# Patient Record
Sex: FEMALE | Race: BLACK OR AFRICAN AMERICAN | Employment: OTHER | URBAN - METROPOLITAN AREA
[De-identification: names, ages, dates, MRNs, and addresses within clinical notes are randomized per-mention and may not be internally consistent; named-entity substitution may affect disease eponyms.]

---

## 2017-01-12 RX ORDER — AMLODIPINE BESYLATE 10 MG/1
TABLET ORAL
Qty: 90 TAB | Refills: 3 | Status: SHIPPED | OUTPATIENT
Start: 2017-01-12 | End: 2017-02-13 | Stop reason: SDUPTHER

## 2017-01-12 RX ORDER — LISINOPRIL AND HYDROCHLOROTHIAZIDE 12.5; 2 MG/1; MG/1
TABLET ORAL
Qty: 90 TAB | Refills: 3 | Status: SHIPPED | OUTPATIENT
Start: 2017-01-12 | End: 2017-02-13 | Stop reason: SDUPTHER

## 2017-01-12 NOTE — TELEPHONE ENCOUNTER
In Basket:   Britt San (pt) requesting Rx refill for: lisinopril-hydrochlorothiazide (PRINZIDE, ZESTORETIC) 20-12.5 mg per tablet and amLODIPine (NORVASC) 10 mg tablet.  Pt contact # [phone]

## 2017-02-13 ENCOUNTER — OFFICE VISIT (OUTPATIENT)
Dept: INTERNAL MEDICINE CLINIC | Age: 67
End: 2017-02-13

## 2017-02-13 VITALS
DIASTOLIC BLOOD PRESSURE: 96 MMHG | SYSTOLIC BLOOD PRESSURE: 136 MMHG | BODY MASS INDEX: 29.02 KG/M2 | HEIGHT: 63 IN | WEIGHT: 163.8 LBS | RESPIRATION RATE: 16 BRPM | OXYGEN SATURATION: 100 % | TEMPERATURE: 98.3 F | HEART RATE: 80 BPM

## 2017-02-13 DIAGNOSIS — I10 ESSENTIAL HYPERTENSION: ICD-10-CM

## 2017-02-13 DIAGNOSIS — E78.5 DYSLIPIDEMIA: ICD-10-CM

## 2017-02-13 DIAGNOSIS — M54.50 ACUTE MIDLINE LOW BACK PAIN WITHOUT SCIATICA: ICD-10-CM

## 2017-02-13 DIAGNOSIS — E11.9 TYPE 2 DIABETES MELLITUS WITHOUT COMPLICATION, WITHOUT LONG-TERM CURRENT USE OF INSULIN (HCC): Primary | ICD-10-CM

## 2017-02-13 RX ORDER — GABAPENTIN 100 MG/1
CAPSULE ORAL
Qty: 270 CAP | Refills: 3 | Status: SHIPPED | OUTPATIENT
Start: 2017-02-13 | End: 2018-03-08 | Stop reason: SDUPTHER

## 2017-02-13 RX ORDER — LISINOPRIL AND HYDROCHLOROTHIAZIDE 12.5; 2 MG/1; MG/1
TABLET ORAL
Qty: 90 TAB | Refills: 3 | Status: ON HOLD | OUTPATIENT
Start: 2017-02-13 | End: 2018-03-29 | Stop reason: SDUPTHER

## 2017-02-13 RX ORDER — AMLODIPINE BESYLATE 10 MG/1
TABLET ORAL
Qty: 90 TAB | Refills: 3 | Status: ON HOLD | OUTPATIENT
Start: 2017-02-13 | End: 2018-03-29 | Stop reason: SDUPTHER

## 2017-02-13 NOTE — PROGRESS NOTES
[address] [facility]  [place] [address]  [address] [facility] [address]  Phone:  [phone]  Fax: [phone]       Chief Complaint   Patient presents with    Follow-up     yearly check up for medication refills    . SUBJECTIVE:    Herlinda Duarte is a 78 y.o. female comes in for return visit stating that in general she is doing well. She has problems with intermittent low back pain. This pretty much remains in the lumbosacral spine area. She remains on her antihypertensive medication as prescribed as is the case with her statin in view of her increased cardiovascular risk. She does not keep her appointments on a regular basis and as a result her last hemoglobin A1C was done in August.  Hopefully it has indeed improved. She continues to lose weight. Current Outpatient Prescriptions   Medication Sig Dispense Refill    gabapentin (NEURONTIN) 100 mg capsule TAKE 1 CAPSULE BY MOUTH THREE TIMES DAILY 270 Cap 3    lisinopril-hydroCHLOROthiazide (PRINZIDE, ZESTORETIC) 20-12.5 mg per tablet TAKE 1 TABLET BY MOUTH DAILY FOR 90 DAYS 90 Tab 3    amLODIPine (NORVASC) 10 mg tablet TAKE 1 TABLET BY MOUTH ONCE A DAY FOR 90 DAYS 90 Tab 3    metFORMIN ER (GLUCOPHAGE XR) 500 mg tablet TAKE 2 tablets twice a day 360 Tab 3    pravastatin (PRAVACHOL) 80 mg tablet TAKE 1 TABLET BY MOUTH EVERY NIGHT AT BEDTIME 90 Tab 3    acetaminophen-codeine (TYLENOL #3) 300-30 mg per tablet Take 1 Tab by mouth every six (6) hours as needed for Pain. Max Daily Amount: 4 Tabs. 40 Tab 0    potassium chloride (K-DUR, KLOR-CON) 20 mEq tablet TAKE 1 TABLET BY MOUTH DAILY 90 Tab 3    dorzolamide (TRUSOPT) 2 % ophthalmic solution       NEXIUM 40 mg capsule       TRAVATAN Z 0.004 % ophthalmic solution       sitagliptin (JANUVIA) 100 mg tablet Take 100 mg by mouth daily.        Past Medical History   Diagnosis Date    Asthma     Diabetes ([address]     Hypercholesterolemia     Hypertension Past Surgical History   Procedure Laterality Date    Hx colonoscopy      Hx orthopaedic       Arthroscopic surgery for both knees    Hx gyn       status post hysterectomy,BSO,and C-sections x3     Allergies   Allergen Reactions    Percocet [Oxycodone-Acetaminophen] Rash and Itching         REVIEW OF SYSTEMS:  General: negative for - chills or fever  ENT: negative for - headaches, nasal congestion or tinnitus  Respiratory: negative for - cough, hemoptysis, shortness of breath or wheezing  Cardiovascular : negative for - chest pain, edema, palpitations or shortness of breath  Gastrointestinal: negative for - abdominal pain, blood in stools, heartburn or nausea/vomiting  Genito-Urinary: no dysuria, trouble voiding, or hematuria  Musculoskeletal: negative for - gait disturbance, joint pain, joint stiffness or joint swelling  Neurological: no TIA or stroke symptoms  Hematologic: no bruises, no bleeding, no swollen glands  Integument: no lumps, mole changes, nail changes or rash  Endocrine: no malaise/lethargy or unexpected weight changes      Social History     Social History    Marital status: SINGLE     Spouse name: N/A    Number of children: N/A    Years of education: N/A     Occupational History    retired [employer]      Social History Main Topics    Smoking status: Never Smoker    Smokeless tobacco: Never Used    Alcohol use 0.5 oz/week     1 Cans of beer per week    Drug use: No    Sexual activity: Not Asked     Other Topics Concern    None     Social History Narrative     Family History   Problem Relation Age of Onset    Cancer Mother     Lung Disease Father     Cancer Brother        OBJECTIVE:    Visit Vitals    BP (!) 136/96 (BP 1 Location: Left arm, BP Patient Position: Sitting)    Pulse 80    Temp 98.3 °F (36.8 °C) (Oral)    Resp 16    Ht 5' 3\" (1.6 m)    Wt 163 lb 12.8 oz (74.3 kg)    SpO2 100%    BMI 29.02 kg/m2     CONSTITUTIONAL: well , well nourished, appears age appropriate  EYES: perrla, eom intact  ENMT:moist mucous membranes, pharynx clear  NECK: supple. Thyroid normal  RESPIRATORY: Chest: clear to ascultation and percussion   CARDIOVASCULAR: Heart: regular rate and rhythm  GASTROINTESTINAL: Abdomen: soft, bowel sounds active  HEMATOLOGIC: no pathological lymph nodes palpated  MUSCULOSKELETAL: Extremities: no edema, pulse 1+   INTEGUMENT: No unusual rashes or suspicious skin lesions noted. Nails appear normal.  NEUROLOGIC: non-focal exam   MENTAL STATUS: alert and oriented, appropriate affect      ASSESSMENT:  1. Type 2 diabetes mellitus without complication, without long-term current use of insulin ([facility]    2. Essential hypertension    3. Dyslipidemia    4. Acute midline low back pain without sciatica        PLAN:    1. Her diabetes is hopefully doing well but I will await the results of her hemoglobin A1C. I remind her to minimize carbohydrate intake. 2. Blood pressure is excellent today and no adjustments are made. 3. She will continue her statin as prescribed and efficacy and compliance will be assessed. 4. I suspect the patient probably has lumbar spinal stenosis. Symptomatic treatment for now. I encourage her to remain as physically active as possible. .  Orders Placed This Encounter    HEMOGLOBIN A1C WITH EAG    METABOLIC PANEL, BASIC    APOLIPOPROTEIN B    gabapentin (NEURONTIN) 100 mg capsule    lisinopril-hydroCHLOROthiazide (PRINZIDE, ZESTORETIC) 20-12.5 mg per tablet    amLODIPine (NORVASC) 10 mg tablet         Follow-up Disposition:  Return in about 4 months (around 6/13/2017).       Lida Gill MD

## 2017-02-13 NOTE — PROGRESS NOTES
1. Have you been to the ER, urgent care clinic since your last visit? Hospitalized since your last visit? No    2. Have you seen or consulted any other health care providers outside of the [address] [place] [address] since your last visit? Include any pap smears or colon screening.  No

## 2017-02-14 LAB
APO B SERPL-MCNC: 79 MG/DL (ref 54–133)
BUN SERPL-MCNC: 14 MG/DL (ref 8–27)
BUN/CREAT SERPL: 18 (ref 11–26)
CALCIUM SERPL-MCNC: 10.5 MG/DL (ref 8.7–10.3)
CHLORIDE SERPL-SCNC: 103 MMOL/L (ref 96–106)
CO2 SERPL-SCNC: 24 MMOL/L (ref 18–29)
CREAT SERPL-MCNC: 0.79 MG/DL (ref 0.57–1)
EST. AVERAGE GLUCOSE BLD GHB EST-MCNC: 151 MG/DL
GLUCOSE SERPL-MCNC: 70 MG/DL (ref 65–99)
HBA1C MFR BLD: 6.9 % (ref 4.8–5.6)
POTASSIUM SERPL-SCNC: 4 MMOL/L (ref 3.5–5.2)
SODIUM SERPL-SCNC: 145 MMOL/L (ref 134–144)

## 2017-05-15 ENCOUNTER — OFFICE VISIT (OUTPATIENT)
Dept: INTERNAL MEDICINE CLINIC | Age: 67
End: 2017-05-15

## 2017-05-15 VITALS
OXYGEN SATURATION: 93 % | TEMPERATURE: 95.9 F | BODY MASS INDEX: 28.46 KG/M2 | HEART RATE: 80 BPM | SYSTOLIC BLOOD PRESSURE: 125 MMHG | HEIGHT: 63 IN | WEIGHT: 160.6 LBS | DIASTOLIC BLOOD PRESSURE: 87 MMHG | RESPIRATION RATE: 14 BRPM

## 2017-05-15 DIAGNOSIS — E11.9 TYPE 2 DIABETES MELLITUS WITHOUT COMPLICATION, WITHOUT LONG-TERM CURRENT USE OF INSULIN (HCC): Primary | ICD-10-CM

## 2017-05-15 DIAGNOSIS — E78.5 DYSLIPIDEMIA: ICD-10-CM

## 2017-05-15 DIAGNOSIS — E66.3 OVERWEIGHT: ICD-10-CM

## 2017-05-15 DIAGNOSIS — I10 ESSENTIAL HYPERTENSION: ICD-10-CM

## 2017-05-15 DIAGNOSIS — Z12.31 ENCOUNTER FOR SCREENING MAMMOGRAM FOR HIGH-RISK PATIENT: ICD-10-CM

## 2017-05-15 DIAGNOSIS — Z00.00 ROUTINE GENERAL MEDICAL EXAMINATION AT A HEALTH CARE FACILITY: ICD-10-CM

## 2017-05-15 NOTE — MR AVS SNAPSHOT
Visit Information Date & Time Provider Department Dept. Phone Encounter #  
 5/15/2017  3:00 PM Priscila Montano MD [facility] [phone] [id number] Follow-up Instructions Return in about 3 months (around 8/15/2017). Upcoming Health Maintenance Date Due DTaP/Tdap/Td series (1 - Tdap) 11/25/1970 Pneumococcal 65+ Low/Medium Risk (1 of 2 - PCV13) 11/25/2014 EYE EXAM RETINAL OR DILATED Q1 5/13/2016 FOOT EXAM Q1 5/2/2017 MICROALBUMIN Q1 5/2/2017 LIPID PANEL Q1 5/2/2017 FOBT Q 1 YEAR AGE 50-75 5/2/2017 MEDICARE YEARLY EXAM 5/3/2017 GLAUCOMA SCREENING Q2Y 5/13/2017 BREAST CANCER SCRN MAMMOGRAM 5/13/2017 INFLUENZA AGE 9 TO ADULT 8/1/2017 HEMOGLOBIN A1C Q6M 8/13/2017 Allergies as of 5/15/2017  Review Complete On: 5/15/2017 By: [facility] Severity Noted Reaction Type Reactions Percocet [Oxycodone-acetaminophen]  10/08/2014    Rash, Itching Current Immunizations  Never Reviewed No immunizations on file. Not reviewed this visit You Were Diagnosed With   
  
 Codes Comments Type 2 diabetes mellitus without complication, without long-term current use of insulin (HCC)    -  Primary ICD-10-CM: E11.9 ICD-9-CM: 250.00 Essential hypertension     ICD-10-CM: I10 
ICD-9-CM: 401.9 Dyslipidemia     ICD-10-CM: E78.5 ICD-9-CM: 272.4 Overweight     ICD-10-CM: C26.5 ICD-9-CM: 278.02 Encounter for screening mammogram for high-risk patient     ICD-10-CM: Z12.31 
ICD-9-CM: V76.11 Vitals BP Pulse Temp Resp Height(growth percentile) Weight(growth percentile) 125/87 (BP 1 Location: Left arm, BP Patient Position: Sitting) 80 95.9 °F (35.5 °C) (Oral) 14 5' 3\" (1.6 m) 160 lb 9.6 oz (72.8 kg) SpO2 BMI OB Status Smoking Status 93% 28.45 kg/m2 Hysterectomy Never Smoker BMI and BSA Data Body Mass Index Body Surface Area  
 28.45 kg/m 2 1.8 m 2 Preferred Pharmacy Pharmacy Name Phone [facility] [address] [facility] [place] [address] [facility] [phone] Your Updated Medication List  
  
   
This list is accurate as of: 5/15/17  4:02 PM.  Always use your most recent med list.  
  
  
  
  
 acetaminophen-codeine 300-30 mg per tablet Commonly known as:  TYLENOL #3 Take 1 Tab by mouth every six (6) hours as needed for Pain. Max Daily Amount: 4 Tabs. amLODIPine 10 mg tablet Commonly known as:  Eward Sara TAKE 1 TABLET BY MOUTH ONCE A DAY FOR 90 DAYS  
  
 dorzolamide 2 % ophthalmic solution Commonly known as:  TRUSOPT  
  
 gabapentin 100 mg capsule Commonly known as:  NEURONTIN  
TAKE 1 CAPSULE BY MOUTH THREE TIMES DAILY JANUVIA 100 mg tablet Generic drug:  SITagliptin Take 100 mg by mouth daily. lisinopril-hydroCHLOROthiazide 20-12.5 mg per tablet Commonly known as:  PRINZIDE, ZESTORETIC  
TAKE 1 TABLET BY MOUTH DAILY FOR 90 DAYS  
  
 metFORMIN  mg tablet Commonly known as:  GLUCOPHAGE XR  
TAKE 2 tablets twice a day NexIUM 40 mg capsule Generic drug:  esomeprazole  
  
 potassium chloride 20 mEq tablet Commonly known as:  K-DUR, KLOR-CON  
TAKE 1 TABLET BY MOUTH DAILY pravastatin 80 mg tablet Commonly known as:  PRAVACHOL  
TAKE 1 TABLET BY MOUTH EVERY NIGHT AT BEDTIME  
  
 TRAVATAN Z 0.004 % ophthalmic solution Generic drug:  travoprost  
  
  
  
  
We Performed the Following APOLIPOPROTEIN B [id number] CPT(R)] CBC WITH AUTOMATED DIFF [42307 CPT(R)] HEMOGLOBIN A1C WITH EAG [54903 CPT(R)] LIPID PANEL [37982 CPT(R)] METABOLIC PANEL, COMPREHENSIVE [90244 CPT(R)] MICROALBUMIN, UR, RAND W/ MICROALBUMIN/CREA RATIO [id number] CPT(R)] RI COLLECTION VENOUS BLOOD,VENIPUNCTURE [id number] CPT(R)] TSH 3RD GENERATION [82202 CPT(R)] URINALYSIS W/ RFLX MICROSCOPIC [90142 CPT(R)] Follow-up Instructions Return in about 3 months (around 8/15/2017). To-Do List   
 05/23/2017 Imaging:  [facility] MAMMO BI SCREENING INCL CAD Introducing [facility]! Easton Montaño introduces [employer] patient portal. Now you can access parts of your medical record, email your doctor's office, and request medication refills online. 1. In your internet browser, go to [email or web address] 2. Click on the First Time User? Click Here link in the Sign In box. You will see the New Member Sign Up page. 3. Enter your [employer] Access Code exactly as it appears below. You will not need to use this code after youve completed the sign-up process. If you do not sign up before the expiration date, you must request a new code. · [employer] Access Code: [email or web address] Expires: 8/13/2017  4:02 PM 
 
4. Enter the last four digits of your Social Security Number (xxxx) and Date of Birth (mm/dd/yyyy) as indicated and click Submit. You will be taken to the next sign-up page. 5. Create a [employer] ID. This will be your [employer] login ID and cannot be changed, so think of one that is secure and easy to remember. 6. Create a [employer] password. You can change your password at any time. 7. Enter your Password Reset Question and Answer. This can be used at a later time if you forget your password. 8. Enter your e-mail address. You will receive e-mail notification when new information is available in [address] 9. Click Sign Up. You can now view and download portions of your medical record. 10. Click the Download Summary menu link to download a portable copy of your medical information. If you have questions, please visit the Frequently Asked Questions section of the [employer] website. Remember, [employer] is NOT to be used for urgent needs. For medical emergencies, dial 911. Now available from your iPhone and Android! Please provide this summary of care documentation to your next provider. Your primary care clinician is listed as Jeovany Aguirre. If you have any questions after today's visit, please call [phone].

## 2017-05-15 NOTE — PROGRESS NOTES
1. Have you been to the ER, urgent care clinic since your last visit? Hospitalized since your last visit? No    2. Have you seen or consulted any other health care providers outside of the [facility] since your last visit? Include any pap smears or colon screening.  No

## 2017-05-15 NOTE — PROGRESS NOTES
[address] [facility]  [place] [address]  [address] [place] [address]  Phone:  [phone]  Fax: [phone]       Chief Complaint   Patient presents with    Follow-up     3 month visit   . SUBJECTIVE:    Isabell Alcantar is a 78 y.o. female comes in for return visit stating that she has done fairly well. She continues to lose weight and I am quite proud of her with this. Her diabetes historically has done well with progressive decrease in her hemoglobin A1C's. She has had no symptomatic hypoglycemia. She has been taking her antihypertensive medication as prescribed. She has also been taking her statin as prescribed. She is now overweight versus obese and I am quite pleased with this. Current Outpatient Prescriptions   Medication Sig Dispense Refill    gabapentin (NEURONTIN) 100 mg capsule TAKE 1 CAPSULE BY MOUTH THREE TIMES DAILY 270 Cap 3    lisinopril-hydroCHLOROthiazide (PRINZIDE, ZESTORETIC) 20-12.5 mg per tablet TAKE 1 TABLET BY MOUTH DAILY FOR 90 DAYS 90 Tab 3    amLODIPine (NORVASC) 10 mg tablet TAKE 1 TABLET BY MOUTH ONCE A DAY FOR 90 DAYS 90 Tab 3    metFORMIN ER (GLUCOPHAGE XR) 500 mg tablet TAKE 2 tablets twice a day 360 Tab 3    pravastatin (PRAVACHOL) 80 mg tablet TAKE 1 TABLET BY MOUTH EVERY NIGHT AT BEDTIME 90 Tab 3    acetaminophen-codeine (TYLENOL #3) 300-30 mg per tablet Take 1 Tab by mouth every six (6) hours as needed for Pain. Max Daily Amount: 4 Tabs. 40 Tab 0    potassium chloride (K-DUR, KLOR-CON) 20 mEq tablet TAKE 1 TABLET BY MOUTH DAILY 90 Tab 3    dorzolamide (TRUSOPT) 2 % ophthalmic solution       NEXIUM 40 mg capsule       TRAVATAN Z 0.004 % ophthalmic solution       sitagliptin (JANUVIA) 100 mg tablet Take 100 mg by mouth daily.        Past Medical History:   Diagnosis Date    Asthma     Diabetes ([address]     Hypercholesterolemia     Hypertension      Past Surgical History:   Procedure Laterality Date    HX COLONOSCOPY      HX GYN      status post hysterectomy,BSO,and C-sections x3    HX ORTHOPAEDIC      Arthroscopic surgery for both knees     Allergies   Allergen Reactions    Percocet [Oxycodone-Acetaminophen] Rash and Itching         REVIEW OF SYSTEMS:  General: negative for - chills or fever  ENT: negative for - headaches, nasal congestion or tinnitus  Respiratory: negative for - cough, hemoptysis, shortness of breath or wheezing  Cardiovascular : negative for - chest pain, edema, palpitations or shortness of breath  Gastrointestinal: negative for - abdominal pain, blood in stools, heartburn or nausea/vomiting  Genito-Urinary: no dysuria, trouble voiding, or hematuria  Musculoskeletal: negative for - gait disturbance, joint pain, joint stiffness or joint swelling  Neurological: no TIA or stroke symptoms  Hematologic: no bruises, no bleeding, no swollen glands  Integument: no lumps, mole changes, nail changes or rash  Endocrine: no malaise/lethargy or unexpected weight changes      Social History     Social History    Marital status: SINGLE     Spouse name: N/A    Number of children: N/A    Years of education: N/A     Occupational History    retired [employer]      Social History Main Topics    Smoking status: Never Smoker    Smokeless tobacco: Never Used    Alcohol use 0.5 oz/week     1 Cans of beer per week    Drug use: No    Sexual activity: Not Asked     Other Topics Concern    None     Social History Narrative     Family History   Problem Relation Age of Onset    Cancer Mother     Lung Disease Father     Cancer Brother        OBJECTIVE:    Visit Vitals    /87 (BP 1 Location: Left arm, BP Patient Position: Sitting)    Pulse 80    Temp 95.9 °F (35.5 °C) (Oral)    Resp 14    Ht 5' 3\" (1.6 m)    Wt 160 lb 9.6 oz (72.8 kg)    SpO2 93%    BMI 28.45 kg/m2     CONSTITUTIONAL: well , well nourished, appears age appropriate  EYES: perrla, eom intact  ENMT:moist mucous membranes, pharynx clear  NECK: supple. Thyroid normal  RESPIRATORY: Chest: clear to ascultation and percussion   CARDIOVASCULAR: Heart: regular rate and rhythm  GASTROINTESTINAL: Abdomen: soft, bowel sounds active  HEMATOLOGIC: no pathological lymph nodes palpated  MUSCULOSKELETAL: Extremities: no edema, pulse 1+   INTEGUMENT: No unusual rashes or suspicious skin lesions noted. Nails appear normal.  NEUROLOGIC: non-focal exam   MENTAL STATUS: alert and oriented, appropriate affect      ASSESSMENT:  1. Type 2 diabetes mellitus without complication, without long-term current use of insulin ([address]    2. Essential hypertension    3. Dyslipidemia    4. Overweight    5. Encounter for screening mammogram for high-risk patient    6. Routine general medical examination at a health care facility        PLAN:    1. Her diabetes is doing quite well most likely based on progressive weight loss that has occurred as well as the obvious improvement over time with her lifestyle changes. 2. Her blood pressure is entirely normal today. No adjustments are made. 3. Lipid status will be assessed. Her last value was at goal.    4. I continue to recommend weight loss. Again carbohydrate restriction is important by avoiding sweets, white bread, and white potatoes. She has made a big improvement in her consumption of sweets. .  Orders Placed This Encounter    [facility] MAMMO BI SCREENING INCL CAD    APOLIPOPROTEIN B    CBC WITH AUTOMATED DIFF    LIPID PANEL    URINALYSIS W/ RFLX MICROSCOPIC    TSH 3RD GENERATION    METABOLIC PANEL, COMPREHENSIVE    HEMOGLOBIN A1C WITH EAG    MICROALBUMIN, UR, RAND W/ MICROALBUMIN/CREA RATIO         Follow-up Disposition:  Return in about 3 months (around 8/15/2017).       Janeth Mcgee MD    This is a Subsequent Medicare Annual Wellness Visit providing Personalized Prevention Plan Services (PPPS) (Performed 12 months after initial AWV and PPPS )    I have reviewed the patient's medical history in detail and updated the computerized patient record. History     Past Medical History:   Diagnosis Date    Asthma     Diabetes ([facility]     Hypercholesterolemia     Hypertension       Past Surgical History:   Procedure Laterality Date    HX COLONOSCOPY      HX GYN      status post hysterectomy,BSO,and C-sections x3    HX ORTHOPAEDIC      Arthroscopic surgery for both knees     Current Outpatient Prescriptions   Medication Sig Dispense Refill    gabapentin (NEURONTIN) 100 mg capsule TAKE 1 CAPSULE BY MOUTH THREE TIMES DAILY 270 Cap 3    lisinopril-hydroCHLOROthiazide (PRINZIDE, ZESTORETIC) 20-12.5 mg per tablet TAKE 1 TABLET BY MOUTH DAILY FOR 90 DAYS 90 Tab 3    amLODIPine (NORVASC) 10 mg tablet TAKE 1 TABLET BY MOUTH ONCE A DAY FOR 90 DAYS 90 Tab 3    metFORMIN ER (GLUCOPHAGE XR) 500 mg tablet TAKE 2 tablets twice a day 360 Tab 3    pravastatin (PRAVACHOL) 80 mg tablet TAKE 1 TABLET BY MOUTH EVERY NIGHT AT BEDTIME 90 Tab 3    acetaminophen-codeine (TYLENOL #3) 300-30 mg per tablet Take 1 Tab by mouth every six (6) hours as needed for Pain. Max Daily Amount: 4 Tabs. 40 Tab 0    potassium chloride (K-DUR, KLOR-CON) 20 mEq tablet TAKE 1 TABLET BY MOUTH DAILY 90 Tab 3    dorzolamide (TRUSOPT) 2 % ophthalmic solution       NEXIUM 40 mg capsule       TRAVATAN Z 0.004 % ophthalmic solution       sitagliptin (JANUVIA) 100 mg tablet Take 100 mg by mouth daily.        Allergies   Allergen Reactions    Percocet [Oxycodone-Acetaminophen] Rash and Itching     Family History   Problem Relation Age of Onset    Cancer Mother     Lung Disease Father     Cancer Brother      Social History   Substance Use Topics    Smoking status: Never Smoker    Smokeless tobacco: Never Used    Alcohol use 0.5 oz/week     1 Cans of beer per week     Patient Active Problem List   Diagnosis Code    Hypertension I10    Diabetes mellitus ([facility] E11.9    Dyslipidemia E78.5    Osteoarthritis M19.90    Reactive airway disease J45.909    Rhinitis J31.0    Acute midline low back pain without sciatica M54.5       Depression Risk Factor Screening:     PHQ over the last two weeks 5/15/2017   Little interest or pleasure in doing things Not at all   Feeling down, depressed or hopeless Not at all   Total Score PHQ 2 0     Alcohol Risk Factor Screening: On any occasion during the past 3 months, have you had more than 3 drinks containing alcohol? Yes    Do you average more than 7 drinks per week? No      Functional Ability and Level of Safety:     Hearing Loss   none    Activities of Daily Living   Self-care. Requires assistance with: no ADLs    Fall Risk     Fall Risk Assessment, last 12 mths 5/15/2017   Able to walk? Yes   Fall in past 12 months? Yes   Fall with injury? Yes   Number of falls in past 12 months 1   Fall Risk Score 2     Abuse Screen   Patient is not abused    Review of Systems   A comprehensive review of systems was negative. Physical Examination     Evaluation of Cognitive Function:  Mood/affect:  neutral  Appearance: age appropriate  Family member/caregiver input: na    Visit Vitals    /87 (BP 1 Location: Left arm, BP Patient Position: Sitting)    Pulse 80    Temp 95.9 °F (35.5 °C) (Oral)    Resp 14    Ht 5' 3\" (1.6 m)    Wt 160 lb 9.6 oz (72.8 kg)    SpO2 93%    BMI 28.45 kg/m2     General appearance: alert, cooperative, no distress, appears stated age  Neck: supple, symmetrical, trachea midline, no adenopathy, thyroid: not enlarged, symmetric, no tenderness/mass/nodules, no carotid bruit and no JVD  Lungs: clear to auscultation bilaterally  Heart: regular rate and rhythm, S1, S2 normal, no murmur, click, rub or gallop  Abdomen: soft, non-tender.  Bowel sounds normal. No masses,  no organomegaly  Extremities: extremities normal, atraumatic, no cyanosis or edema  Neurologic: Grossly normal    Patient Care Team:  Catherine Crabtree MD as PCP - General (Internal Medicine)    Advice/Referrals/Counseling Education and counseling provided:  Are appropriate based on today's review and evaluation      Assessment/Plan       ICD-10-CM ICD-9-CM    1. Type 2 diabetes mellitus without complication, without long-term current use of insulin (HCC) E11.9 250.00 HEMOGLOBIN A1C WITH EAG      MICROALBUMIN, UR, RAND W/ MICROALBUMIN/CREA RATIO   2. Essential hypertension I10 401.9 CBC WITH AUTOMATED DIFF      URINALYSIS W/ RFLX MICROSCOPIC      NE COLLECTION VENOUS BLOOD,VENIPUNCTURE      METABOLIC PANEL, COMPREHENSIVE   3. Dyslipidemia E78.5 272.4 APOLIPOPROTEIN B      LIPID PANEL      TSH 3RD GENERATION   4. Overweight E66.3 278.02    5. Encounter for screening mammogram for high-risk patient Z12.31 V76.11 [facility] MAMMO BI SCREENING INCL CAD   6. Routine general medical examination at a health care facility Z00.00 V70.0    .

## 2017-05-16 LAB
ALBUMIN SERPL-MCNC: 4.5 G/DL (ref 3.6–4.8)
ALBUMIN/CREAT UR: 5.2 MG/G CREAT (ref 0–30)
ALBUMIN/GLOB SERPL: 1.7 {RATIO} (ref 1.2–2.2)
ALP SERPL-CCNC: 74 IU/L (ref 39–117)
ALT SERPL-CCNC: 16 IU/L (ref 0–32)
APO B SERPL-MCNC: 80 MG/DL (ref 54–133)
APPEARANCE UR: CLEAR
AST SERPL-CCNC: 17 IU/L (ref 0–40)
BASOPHILS # BLD AUTO: 0 X10E3/UL (ref 0–0.2)
BASOPHILS NFR BLD AUTO: 0 %
BILIRUB SERPL-MCNC: 0.7 MG/DL (ref 0–1.2)
BILIRUB UR QL STRIP: NEGATIVE
BUN SERPL-MCNC: 17 MG/DL (ref 8–27)
BUN/CREAT SERPL: 21 (ref 12–28)
CALCIUM SERPL-MCNC: 10.7 MG/DL (ref 8.7–10.3)
CHLORIDE SERPL-SCNC: 102 MMOL/L (ref 96–106)
CHOLEST SERPL-MCNC: 160 MG/DL (ref 100–199)
CO2 SERPL-SCNC: 27 MMOL/L (ref 18–29)
COLOR UR: YELLOW
CREAT SERPL-MCNC: 0.82 MG/DL (ref 0.57–1)
CREAT UR-MCNC: 173.8 MG/DL
EOSINOPHIL # BLD AUTO: 0.1 X10E3/UL (ref 0–0.4)
EOSINOPHIL NFR BLD AUTO: 1 %
ERYTHROCYTE [DISTWIDTH] IN BLOOD BY AUTOMATED COUNT: 13.9 % (ref 12.3–15.4)
EST. AVERAGE GLUCOSE BLD GHB EST-MCNC: 148 MG/DL
GLOBULIN SER CALC-MCNC: 2.7 G/DL (ref 1.5–4.5)
GLUCOSE SERPL-MCNC: 88 MG/DL (ref 65–99)
GLUCOSE UR QL: NEGATIVE
HBA1C MFR BLD: 6.8 % (ref 4.8–5.6)
HCT VFR BLD AUTO: 38.6 % (ref 34–46.6)
HDLC SERPL-MCNC: 59 MG/DL
HGB BLD-MCNC: 12.4 G/DL (ref 11.1–15.9)
HGB UR QL STRIP: NEGATIVE
IMM GRANULOCYTES # BLD: 0 X10E3/UL (ref 0–0.1)
IMM GRANULOCYTES NFR BLD: 0 %
KETONES UR QL STRIP: NEGATIVE
LDLC SERPL CALC-MCNC: 73 MG/DL (ref 0–99)
LEUKOCYTE ESTERASE UR QL STRIP: NEGATIVE
LYMPHOCYTES # BLD AUTO: 4.3 X10E3/UL (ref 0.7–3.1)
LYMPHOCYTES NFR BLD AUTO: 58 %
MCH RBC QN AUTO: 28.1 PG (ref 26.6–33)
MCHC RBC AUTO-ENTMCNC: 32.1 G/DL (ref 31.5–35.7)
MCV RBC AUTO: 87 FL (ref 79–97)
MICRO URNS: NORMAL
MICROALBUMIN UR-MCNC: 9 UG/ML
MONOCYTES # BLD AUTO: 0.5 X10E3/UL (ref 0.1–0.9)
MONOCYTES NFR BLD AUTO: 7 %
NEUTROPHILS # BLD AUTO: 2.5 X10E3/UL (ref 1.4–7)
NEUTROPHILS NFR BLD AUTO: 34 %
NITRITE UR QL STRIP: NEGATIVE
PH UR STRIP: 5.5 [PH] (ref 5–7.5)
PLATELET # BLD AUTO: 223 X10E3/UL (ref 150–379)
POTASSIUM SERPL-SCNC: 4.2 MMOL/L (ref 3.5–5.2)
PROT SERPL-MCNC: 7.2 G/DL (ref 6–8.5)
PROT UR QL STRIP: NEGATIVE
RBC # BLD AUTO: 4.42 X10E6/UL (ref 3.77–5.28)
SODIUM SERPL-SCNC: 145 MMOL/L (ref 134–144)
SP GR UR: 1.02 (ref 1–1.03)
TRIGL SERPL-MCNC: 140 MG/DL (ref 0–149)
TSH SERPL DL<=0.005 MIU/L-ACNC: 0.73 UIU/ML (ref 0.45–4.5)
UROBILINOGEN UR STRIP-MCNC: 0.2 MG/DL (ref 0.2–1)
VLDLC SERPL CALC-MCNC: 28 MG/DL (ref 5–40)
WBC # BLD AUTO: 7.4 X10E3/UL (ref 3.4–10.8)

## 2017-06-26 RX ORDER — POTASSIUM CHLORIDE 20 MEQ/1
TABLET, EXTENDED RELEASE ORAL
Qty: 90 TAB | Refills: 3 | Status: SHIPPED | OUTPATIENT
Start: 2017-06-26 | End: 2017-06-30 | Stop reason: SDUPTHER

## 2017-06-26 RX ORDER — ACETAMINOPHEN AND CODEINE PHOSPHATE 300; 30 MG/1; MG/1
TABLET ORAL
Qty: 40 TAB | Refills: 0 | Status: SHIPPED | OUTPATIENT
Start: 2017-06-26 | End: 2017-09-18 | Stop reason: CLARIF

## 2017-07-02 RX ORDER — POTASSIUM CHLORIDE 20 MEQ/1
TABLET, EXTENDED RELEASE ORAL
Qty: 90 TAB | Refills: 3 | Status: SHIPPED | OUTPATIENT
Start: 2017-07-02 | End: 2017-07-06 | Stop reason: SDUPTHER

## 2017-07-06 RX ORDER — POTASSIUM CHLORIDE 20 MEQ/1
TABLET, EXTENDED RELEASE ORAL
Qty: 90 TAB | Refills: 3 | Status: SHIPPED | OUTPATIENT
Start: 2017-07-06 | End: 2018-09-05 | Stop reason: SDUPTHER

## 2017-08-28 ENCOUNTER — OFFICE VISIT (OUTPATIENT)
Dept: INTERNAL MEDICINE CLINIC | Age: 67
End: 2017-08-28

## 2017-08-28 VITALS
DIASTOLIC BLOOD PRESSURE: 78 MMHG | WEIGHT: 158 LBS | TEMPERATURE: 96.9 F | OXYGEN SATURATION: 100 % | HEART RATE: 75 BPM | BODY MASS INDEX: 28 KG/M2 | HEIGHT: 63 IN | RESPIRATION RATE: 21 BRPM | SYSTOLIC BLOOD PRESSURE: 135 MMHG

## 2017-08-28 DIAGNOSIS — E78.5 DYSLIPIDEMIA: ICD-10-CM

## 2017-08-28 DIAGNOSIS — E11.9 TYPE 2 DIABETES MELLITUS WITHOUT COMPLICATION, WITHOUT LONG-TERM CURRENT USE OF INSULIN (HCC): Primary | ICD-10-CM

## 2017-08-28 DIAGNOSIS — Z12.31 ENCOUNTER FOR SCREENING MAMMOGRAM FOR HIGH-RISK PATIENT: ICD-10-CM

## 2017-08-28 DIAGNOSIS — I10 ESSENTIAL HYPERTENSION: ICD-10-CM

## 2017-08-28 DIAGNOSIS — M17.10 PRIMARY OSTEOARTHRITIS OF KNEE, UNSPECIFIED LATERALITY: ICD-10-CM

## 2017-08-28 NOTE — PROGRESS NOTES
.1. Have you been to the ER, urgent care clinic since your last visit? Hospitalized since your last visit? No    2. Have you seen or consulted any other health care providers outside of the [address] [place] [address] since your last visit? Include any pap smears or colon screening.  No

## 2017-08-28 NOTE — MR AVS SNAPSHOT
Visit Information Date & Time Provider Department Dept. Phone Encounter #  
 8/28/2017  2:45 PM José Manuel Barker MD [facility] - [place] [phone] [id number] Follow-up Instructions Return in about 4 months (around 12/28/2017). Upcoming Health Maintenance Date Due  
 EYE EXAM RETINAL OR DILATED Q1 5/13/2016 FOBT Q 1 YEAR AGE 50-75 5/2/2017 HEMOGLOBIN A1C Q6M 11/15/2017 MICROALBUMIN Q1 5/15/2018 LIPID PANEL Q1 5/15/2018 MEDICARE YEARLY EXAM 5/16/2018 Pneumococcal 65+ Low/Medium Risk (2 of 2 - PPSV23) 8/28/2018 FOOT EXAM Q1 8/28/2018 GLAUCOMA SCREENING Q2Y 8/28/2019 BREAST CANCER SCRN MAMMOGRAM 8/28/2019 DTaP/Tdap/Td series (2 - Td) 8/28/2027 Allergies as of 8/28/2017  Review Complete On: 8/28/2017 By: Fabricio Serrano Severity Noted Reaction Type Reactions Percocet [Oxycodone-acetaminophen]  10/08/2014    Rash, Itching Current Immunizations  Never Reviewed No immunizations on file. Not reviewed this visit You Were Diagnosed With   
  
 Codes Comments Type 2 diabetes mellitus without complication, without long-term current use of insulin (HCC)    -  Primary ICD-10-CM: E11.9 ICD-9-CM: 250.00 Essential hypertension     ICD-10-CM: I10 
ICD-9-CM: 401.9 Dyslipidemia     ICD-10-CM: E78.5 ICD-9-CM: 272.4 Primary osteoarthritis of knee, unspecified laterality     ICD-10-CM: M17.10 ICD-9-CM: 715.16 Encounter for screening mammogram for high-risk patient     ICD-10-CM: Z12.31 
ICD-9-CM: V76.11 Vitals BP Pulse Temp Resp Height(growth percentile) Weight(growth percentile) 135/78 (BP 1 Location: Right arm, BP Patient Position: Sitting) 75 96.9 °F (36.1 °C) (Oral) 21 5' 3\" (1.6 m) 158 lb (71.7 kg) SpO2 BMI OB Status Smoking Status 100% 27.99 kg/m2 Hysterectomy Never Smoker BMI and BSA Data  Body Mass Index Body Surface Area  
 27.99 kg/m 2 1.79 m 2  
  
  
 Preferred Pharmacy Pharmacy Name Phone [facility] [address] [facility],  [address] [phone] Your Updated Medication List  
  
   
This list is accurate as of: 8/28/17  4:26 PM.  Always use your most recent med list.  
  
  
  
  
 acetaminophen-codeine 300-30 mg per tablet Commonly known as:  TYLENOL #3  
TAKE 1 TABLET BY MOUTH EVERY 6 HOURS AS NEEDED FOR PAIN. DO NOT EXCEED MORE THAN 4 TABLETS PER DAY. amLODIPine 10 mg tablet Commonly known as:  Coleman Inks TAKE 1 TABLET BY MOUTH ONCE A DAY FOR 90 DAYS  
  
 dorzolamide 2 % ophthalmic solution Commonly known as:  TRUSOPT  
  
 gabapentin 100 mg capsule Commonly known as:  NEURONTIN  
TAKE 1 CAPSULE BY MOUTH THREE TIMES DAILY JANUVIA 100 mg tablet Generic drug:  SITagliptin Take 100 mg by mouth daily. lisinopril-hydroCHLOROthiazide 20-12.5 mg per tablet Commonly known as:  PRINZIDE, ZESTORETIC  
TAKE 1 TABLET BY MOUTH DAILY FOR 90 DAYS  
  
 metFORMIN  mg tablet Commonly known as:  GLUCOPHAGE XR  
TAKE 2 tablets twice a day NexIUM 40 mg capsule Generic drug:  esomeprazole  
  
 potassium chloride 20 mEq tablet Commonly known as:  K-DUR, KLOR-CON  
TAKE 1 TABLET BY MOUTH DAILY pravastatin 80 mg tablet Commonly known as:  PRAVACHOL  
TAKE 1 TABLET BY MOUTH EVERY NIGHT AT BEDTIME  
  
 TRAVATAN Z 0.004 % ophthalmic solution Generic drug:  travoprost  
  
  
  
  
We Performed the Following HEMOGLOBIN A1C WITH EAG [15693 CPT(R)] METABOLIC PANEL, BASIC [82956 CPT(R)] Follow-up Instructions Return in about 4 months (around 12/28/2017). To-Do List   
 08/29/2017 Imaging:  ANGELA MAMMO BI SCREENING INCL CAD Introducing [facility]! Blaise Farrell introduces [employer] patient portal. Now you can access parts of your medical record, email your doctor's office, and request medication refills online. 1. In your internet browser, go to [email or web address] 2. Click on the First Time User? Click Here link in the Sign In box. You will see the New Member Sign Up page. 3. Enter your [employer] Access Code exactly as it appears below. You will not need to use this code after youve completed the sign-up process. If you do not sign up before the expiration date, you must request a new code. · [employer] Access Code: [email or web address] Expires: 11/26/2017  4:26 PM 
 
4. Enter the last four digits of your Social Security Number (xxxx) and Date of Birth (mm/dd/yyyy) as indicated and click Submit. You will be taken to the next sign-up page. 5. Create a Centrot ID. This will be your Biscoot login ID and cannot be changed, so think of one that is secure and easy to remember. 6. Create a B[employer]oot password. You can change your password at any time. 7. Enter your Password Reset Question and Answer. This can be used at a later time if you forget your password. 8. Enter your e-mail address. You will receive e-mail notification when new information is available in [address] 9. Click Sign Up. You can now view and download portions of your medical record. 10. Click the Download Summary menu link to download a portable copy of your medical information. If you have questions, please visit the Frequently Asked Questions section of the [employer] website. Remember, [employer] is NOT to be used for urgent needs. For medical emergencies, dial 911. Now available from your iPhone and Android! Please provide this summary of care documentation to your next provider. Your primary care clinician is listed as Jeovany Aguirre. If you have any questions after today's visit, please call [phone].

## 2017-08-28 NOTE — PROGRESS NOTES
[address] [facility]  [place] [address]  [address] [place] [address]  Phone:  [phone]  Fax: [phone]       Chief Complaint   Patient presents with    Diabetes   . SUBJECTIVE:    Arminda Adamson is a 78 y.o. female Comes in for return visit stating that she is doing well. She states her blood sugars have been excellent. She has had no symptomatic hypoglycemia. She has been taking her antihypertensive medication as prescribed with no adverse effects. Her joints have been doing fairly well. She has had no further problems with her knees. She did indeed have osteoarthritis previously. Finally she has an increased cardiovascular risk, which is primary and is taking her statin. She is having no side effects. Current Outpatient Prescriptions   Medication Sig Dispense Refill    potassium chloride (K-DUR, KLOR-CON) 20 mEq tablet TAKE 1 TABLET BY MOUTH DAILY 90 Tab 3    gabapentin (NEURONTIN) 100 mg capsule TAKE 1 CAPSULE BY MOUTH THREE TIMES DAILY 270 Cap 3    lisinopril-hydroCHLOROthiazide (PRINZIDE, ZESTORETIC) 20-12.5 mg per tablet TAKE 1 TABLET BY MOUTH DAILY FOR 90 DAYS 90 Tab 3    amLODIPine (NORVASC) 10 mg tablet TAKE 1 TABLET BY MOUTH ONCE A DAY FOR 90 DAYS 90 Tab 3    metFORMIN ER (GLUCOPHAGE XR) 500 mg tablet TAKE 2 tablets twice a day 360 Tab 3    pravastatin (PRAVACHOL) 80 mg tablet TAKE 1 TABLET BY MOUTH EVERY NIGHT AT BEDTIME 90 Tab 3    dorzolamide (TRUSOPT) 2 % ophthalmic solution       NEXIUM 40 mg capsule       TRAVATAN Z 0.004 % ophthalmic solution       sitagliptin (JANUVIA) 100 mg tablet Take 100 mg by mouth daily.  acetaminophen-codeine (TYLENOL #3) 300-30 mg per tablet TAKE 1 TABLET BY MOUTH EVERY 6 HOURS AS NEEDED FOR PAIN. DO NOT EXCEED MORE THAN 4 TABLETS PER DAY.  36 Tab 0     Past Medical History:   Diagnosis Date    Asthma     Diabetes ([address]     Hypercholesterolemia     Hypertension      Past Surgical History: Procedure Laterality Date    HX COLONOSCOPY      HX GYN      status post hysterectomy,BSO,and C-sections x3    HX ORTHOPAEDIC      Arthroscopic surgery for both knees     Allergies   Allergen Reactions    Percocet [Oxycodone-Acetaminophen] Rash and Itching         REVIEW OF SYSTEMS:  General: negative for - chills or fever  ENT: negative for - headaches, nasal congestion or tinnitus  Respiratory: negative for - cough, hemoptysis, shortness of breath or wheezing  Cardiovascular : negative for - chest pain, edema, palpitations or shortness of breath  Gastrointestinal: negative for - abdominal pain, blood in stools, heartburn or nausea/vomiting  Genito-Urinary: no dysuria, trouble voiding, or hematuria  Musculoskeletal: negative for - gait disturbance, joint pain, joint stiffness or joint swelling  Neurological: no TIA or stroke symptoms  Hematologic: no bruises, no bleeding, no swollen glands  Integument: no lumps, mole changes, nail changes or rash  Endocrine: no malaise/lethargy or unexpected weight changes      Social History     Social History    Marital status: SINGLE     Spouse name: N/A    Number of children: N/A    Years of education: N/A     Occupational History    retired [employer]      Social History Main Topics    Smoking status: Never Smoker    Smokeless tobacco: Never Used    Alcohol use 0.5 oz/week     1 Cans of beer per week    Drug use: No    Sexual activity: Not Asked     Other Topics Concern    None     Social History Narrative     Family History   Problem Relation Age of Onset    Cancer Mother     Lung Disease Father     Cancer Brother        OBJECTIVE:    Visit Vitals    /78 (BP 1 Location: Right arm, BP Patient Position: Sitting)    Pulse 75    Temp 96.9 °F (36.1 °C) (Oral)    Resp 21    Ht 5' 3\" (1.6 m)    Wt 158 lb (71.7 kg)    SpO2 100%    BMI 27.99 kg/m2     CONSTITUTIONAL: well , well nourished, appears age appropriate  EYES: perrla, eom intact  ENMT:moist mucous membranes, pharynx clear  NECK: supple. Thyroid normal  RESPIRATORY: Chest: clear to ascultation and percussion   CARDIOVASCULAR: Heart: regular rate and rhythm  GASTROINTESTINAL: Abdomen: soft, bowel sounds active  HEMATOLOGIC: no pathological lymph nodes palpated  MUSCULOSKELETAL: Extremities: no edema, pulse 1+   INTEGUMENT: No unusual rashes or suspicious skin lesions noted. Nails appear normal.  NEUROLOGIC: non-focal exam   MENTAL STATUS: alert and oriented, appropriate affect      ASSESSMENT:  1. Type 2 diabetes mellitus without complication, without long-term current use of insulin ([address]    2. Essential hypertension    3. Dyslipidemia    4. Primary osteoarthritis of knee, unspecified laterality    5. Encounter for screening mammogram for high-risk patient        PLAN:    1. The patient's diabetes appears to be doing well. I will await the results of her hemoglobin A1c.  2. Blood pressure is excellent today. No adjustments are made. 3. She will continue statin as prescribed, and her last Apo B level was superb. 4. Osteoarthritis is stable. No intervention for now. 5. Mammograms are scheduled. 6. I encourage her to remain as physically active as possible. She is tasking care of her granddaughter daily. .  Orders Placed This Encounter    ANGELA MAMMO BI SCREENING INCL CAD    METABOLIC PANEL, BASIC    HEMOGLOBIN A1C WITH EAG         Follow-up Disposition:  Return in about 4 months (around 12/28/2017).       Keyonna Mendoza MD

## 2017-08-29 LAB
BUN SERPL-MCNC: 16 MG/DL (ref 8–27)
BUN/CREAT SERPL: 21 (ref 12–28)
CALCIUM SERPL-MCNC: 10.9 MG/DL (ref 8.7–10.3)
CHLORIDE SERPL-SCNC: 104 MMOL/L (ref 96–106)
CO2 SERPL-SCNC: 29 MMOL/L (ref 18–29)
CREAT SERPL-MCNC: 0.76 MG/DL (ref 0.57–1)
EST. AVERAGE GLUCOSE BLD GHB EST-MCNC: 137 MG/DL
GLUCOSE SERPL-MCNC: 79 MG/DL (ref 65–99)
HBA1C MFR BLD: 6.4 % (ref 4.8–5.6)
POTASSIUM SERPL-SCNC: 4.1 MMOL/L (ref 3.5–5.2)
SODIUM SERPL-SCNC: 146 MMOL/L (ref 134–144)

## 2017-09-18 RX ORDER — TRAMADOL HYDROCHLORIDE 50 MG/1
50 TABLET ORAL
Qty: 50 TAB | Refills: 0 | Status: SHIPPED | OUTPATIENT
Start: 2017-09-18 | End: 2018-03-24 | Stop reason: CLARIF

## 2017-10-04 RX ORDER — PRAVASTATIN SODIUM 80 MG/1
TABLET ORAL
Qty: 90 TAB | Refills: 3 | Status: SHIPPED | OUTPATIENT
Start: 2017-10-04 | End: 2017-10-18 | Stop reason: CLARIF

## 2017-12-05 ENCOUNTER — APPOINTMENT (OUTPATIENT)
Dept: GENERAL RADIOLOGY | Age: 67
End: 2017-12-05
Attending: EMERGENCY MEDICINE
Payer: MEDICARE

## 2017-12-05 ENCOUNTER — HOSPITAL ENCOUNTER (EMERGENCY)
Age: 67
Discharge: HOME | End: 2017-12-05
Attending: EMERGENCY MEDICINE
Payer: MEDICARE

## 2017-12-05 VITALS
DIASTOLIC BLOOD PRESSURE: 73 MMHG | BODY MASS INDEX: 34.1 KG/M2 | SYSTOLIC BLOOD PRESSURE: 125 MMHG | HEART RATE: 84 BPM | OXYGEN SATURATION: 98 % | HEIGHT: 57 IN | TEMPERATURE: 97.9 F | WEIGHT: 158.06 LBS | RESPIRATION RATE: 23 BRPM

## 2017-12-05 DIAGNOSIS — J20.9 ACUTE BRONCHITIS, UNSPECIFIED ORGANISM: Primary | ICD-10-CM

## 2017-12-05 LAB
ALBUMIN SERPL-MCNC: 3.6 G/DL (ref 3.5–5)
ALBUMIN/GLOB SERPL: 0.9 {RATIO} (ref 1.1–2.2)
ALP SERPL-CCNC: 82 U/L (ref 45–117)
ALT SERPL-CCNC: 20 U/L (ref 12–78)
ANION GAP SERPL CALC-SCNC: 7 MMOL/L (ref 5–15)
AST SERPL-CCNC: 8 U/L (ref 15–37)
BASOPHILS # BLD: 0 K/UL (ref 0–0.1)
BASOPHILS NFR BLD: 0 % (ref 0–1)
BILIRUB SERPL-MCNC: 0.7 MG/DL (ref 0.2–1)
BUN SERPL-MCNC: 12 MG/DL (ref 6–20)
BUN/CREAT SERPL: 18 (ref 12–20)
CALCIUM SERPL-MCNC: 9.6 MG/DL (ref 8.5–10.1)
CHLORIDE SERPL-SCNC: 106 MMOL/L (ref 97–108)
CO2 SERPL-SCNC: 28 MMOL/L (ref 21–32)
CREAT SERPL-MCNC: 0.67 MG/DL (ref 0.55–1.02)
EOSINOPHIL # BLD: 0.1 K/UL (ref 0–0.4)
EOSINOPHIL NFR BLD: 1 % (ref 0–7)
ERYTHROCYTE [DISTWIDTH] IN BLOOD BY AUTOMATED COUNT: 13 % (ref 11.5–14.5)
FLUAV AG NPH QL IA: NEGATIVE
FLUBV AG NOSE QL IA: NEGATIVE
GLOBULIN SER CALC-MCNC: 4 G/DL (ref 2–4)
GLUCOSE SERPL-MCNC: 91 MG/DL (ref 65–100)
HCT VFR BLD AUTO: 37.7 % (ref 35–47)
HGB BLD-MCNC: 12.3 G/DL (ref 11.5–16)
LYMPHOCYTES # BLD: 2.9 K/UL (ref 0.8–3.5)
LYMPHOCYTES NFR BLD: 42 % (ref 12–49)
MCH RBC QN AUTO: 28.2 PG (ref 26–34)
MCHC RBC AUTO-ENTMCNC: 32.6 G/DL (ref 30–36.5)
MCV RBC AUTO: 86.5 FL (ref 80–99)
MONOCYTES # BLD: 0.5 K/UL (ref 0–1)
MONOCYTES NFR BLD: 7 % (ref 5–13)
NEUTS SEG # BLD: 3.5 K/UL (ref 1.8–8)
NEUTS SEG NFR BLD: 50 % (ref 32–75)
PLATELET # BLD AUTO: 190 K/UL (ref 150–400)
POTASSIUM SERPL-SCNC: 3.8 MMOL/L (ref 3.5–5.1)
PROT SERPL-MCNC: 7.6 G/DL (ref 6.4–8.2)
RBC # BLD AUTO: 4.36 M/UL (ref 3.8–5.2)
SODIUM SERPL-SCNC: 141 MMOL/L (ref 136–145)
TROPONIN I SERPL-MCNC: <0.04 NG/ML
WBC # BLD AUTO: 7 K/UL (ref 3.6–11)

## 2017-12-05 PROCEDURE — 96360 HYDRATION IV INFUSION INIT: CPT

## 2017-12-05 PROCEDURE — 87804 INFLUENZA ASSAY W/OPTIC: CPT | Performed by: EMERGENCY MEDICINE

## 2017-12-05 PROCEDURE — 93005 ELECTROCARDIOGRAM TRACING: CPT

## 2017-12-05 PROCEDURE — 85025 COMPLETE CBC W/AUTO DIFF WBC: CPT | Performed by: EMERGENCY MEDICINE

## 2017-12-05 PROCEDURE — 74011250636 HC RX REV CODE- 250/636: Performed by: EMERGENCY MEDICINE

## 2017-12-05 PROCEDURE — 84484 ASSAY OF TROPONIN QUANT: CPT | Performed by: EMERGENCY MEDICINE

## 2017-12-05 PROCEDURE — 71020 XR CHEST PA LAT: CPT

## 2017-12-05 PROCEDURE — 80053 COMPREHEN METABOLIC PANEL: CPT | Performed by: EMERGENCY MEDICINE

## 2017-12-05 PROCEDURE — 99285 EMERGENCY DEPT VISIT HI MDM: CPT

## 2017-12-05 PROCEDURE — 94762 N-INVAS EAR/PLS OXIMTRY CONT: CPT

## 2017-12-05 PROCEDURE — 36415 COLL VENOUS BLD VENIPUNCTURE: CPT | Performed by: EMERGENCY MEDICINE

## 2017-12-05 RX ORDER — BENZONATATE 100 MG/1
100 CAPSULE ORAL
Qty: 30 CAP | Refills: 0 | Status: SHIPPED | OUTPATIENT
Start: 2017-12-05 | End: 2017-12-12

## 2017-12-05 RX ORDER — CEFUROXIME AXETIL 500 MG/1
500 TABLET ORAL 2 TIMES DAILY
Qty: 14 TAB | Refills: 0 | Status: SHIPPED | OUTPATIENT
Start: 2017-12-05 | End: 2017-12-12

## 2017-12-05 RX ADMIN — SODIUM CHLORIDE 1000 ML: 900 INJECTION, SOLUTION INTRAVENOUS at 16:49

## 2017-12-05 NOTE — ED TRIAGE NOTES
Pt c/o chest pain and sob since Friday, +fever- per pt, did not take her temperature, +dry cough, +chills, denies n/v

## 2017-12-05 NOTE — ED PROVIDER NOTES
HPI Comments: 75 y.o. female with past medical history significant for HTN, DM, asthma, hypercholesterolemia, and knee surgery who presents from home  with chief complaint of cough. The pt c/o nonproductive cough, weakness, intermittent fever, night sweats, sternal CP with coughing and inspirations, dental aches, and R sided neck pain when she turns her neck that has persisted for a week. The pt has been taking Tylenol. Her granddaughter recently had RSV. The pt denies rhinorrhea, problems with BM or urination, SOB, exertional CP, and getting the flu shot this season. There are no other acute medical concerns at this time. Social hx: nonsmoker, no EtOH use  PCP: Gabi Cavanaugh MD    Note written by Vandana Ngo, as dictated by Amaya Fiore MD 4:48 PM      The history is provided by the patient. No  was used. Past Medical History:   Diagnosis Date    Asthma     Diabetes [address] [address]     Hypercholesterolemia     Hypertension        Past Surgical History:   Procedure Laterality Date    HX COLONOSCOPY      HX GYN      status post hysterectomy,BSO,and C-sections x3    HX ORTHOPAEDIC      Arthroscopic surgery for both knees         Family History:   Problem Relation Age of Onset    Cancer Mother     Lung Disease Father     Cancer Brother        Social History     Social History    Marital status: SINGLE     Spouse name: N/A    Number of children: N/A    Years of education: N/A     Occupational History    retired [employer]      Social History Main Topics    Smoking status: Never Smoker    Smokeless tobacco: Never Used    Alcohol use 0.5 oz/week     1 Cans of beer per week    Drug use: No    Sexual activity: Not on file     Other Topics Concern    Not on file     Social History Narrative         ALLERGIES: Percocet [oxycodone-acetaminophen]    Review of Systems   Constitutional: Positive for diaphoresis (night sweats) and fever.  Negative for activity change, appetite change and fatigue. HENT: Positive for dental problem (aches). Negative for ear pain, facial swelling, rhinorrhea, sore throat and trouble swallowing. Eyes: Negative for pain, discharge and visual disturbance. Respiratory: Positive for cough. Negative for chest tightness, shortness of breath and wheezing. Cardiovascular: Positive for chest pain. Negative for palpitations. Gastrointestinal: Negative for abdominal pain, blood in stool, nausea and vomiting. Genitourinary: Negative for difficulty urinating, flank pain and hematuria. Musculoskeletal: Positive for neck pain. Negative for arthralgias, joint swelling and myalgias. Skin: Negative for color change and rash. Neurological: Positive for weakness. Negative for dizziness, numbness and headaches. Hematological: Negative for adenopathy. Does not bruise/bleed easily. Psychiatric/Behavioral: Negative for behavioral problems, confusion and sleep disturbance. All other systems reviewed and are negative. Vitals:    12/05/17 1323   BP: 137/89   Pulse: 87   Resp: 20   Temp: 97.9 °F (36.6 °C)   SpO2: 99%   Weight: 71.7 kg (158 lb 1 oz)   Height: 4' 9\" (1.448 m)            Physical Exam   Constitutional: She is oriented to person, place, and time. She appears well-developed and well-nourished. No distress. Speaking in full sentences;     HENT:   Head: Normocephalic and atraumatic. Nose: Nose normal.   Mouth/Throat: Oropharynx is clear and moist.   Nasal congestion   Eyes: Conjunctivae and EOM are normal. Pupils are equal, round, and reactive to light. No scleral icterus. Neck: Normal range of motion. Neck supple. No JVD present. No tracheal deviation present. No thyromegaly present. No carotid bruits noted. Cardiovascular: Normal rate, regular rhythm, normal heart sounds and intact distal pulses. Exam reveals no gallop and no friction rub. No murmur heard.   Pulmonary/Chest: Effort normal and breath sounds normal. No respiratory distress. She has no wheezes. She has no rales. She exhibits no tenderness. Abdominal: Soft. Bowel sounds are normal. She exhibits no distension and no mass. There is no tenderness. There is no rebound and no guarding. Musculoskeletal: Normal range of motion. She exhibits no edema or tenderness. Lymphadenopathy:     She has no cervical adenopathy. Neurological: She is alert and oriented to person, place, and time. She has normal reflexes. No cranial nerve deficit. Coordination normal.   Skin: Skin is warm and dry. No rash noted. No erythema. Psychiatric: She has a normal mood and affect. Her behavior is normal. Judgment and thought content normal.   Nursing note and vitals reviewed. Note written by Vandana You, as dictated by Amanda Bee MD 4:50 PM       MDM  Number of Diagnoses or Management Options  Acute bronchitis, unspecified organism: new and requires workup     Amount and/or Complexity of Data Reviewed  Clinical lab tests: ordered and reviewed  Tests in the radiology section of CPT®: ordered and reviewed  Decide to obtain previous medical records or to obtain history from someone other than the patient: yes  Review and summarize past medical records: yes  Independent visualization of images, tracings, or specimens: yes    Risk of Complications, Morbidity, and/or Mortality  Presenting problems: high  Diagnostic procedures: high  Management options: high    Patient Progress  Patient progress: stable    ED Course       Procedures    ED MD EKG interpretation: NSR with rate82 BPM. No ectopy noted. Normal axis. RBBB. No ischemic changes appreciated. Amanda Bee MD     Labs and x rays are unremarkable. Will discharge home with treatment for bronchitis and have the patient follow up with own MD if continued difficulty.

## 2017-12-05 NOTE — ED NOTES
ED MD EKG interpretation: NSR with rate 82 BPM. No ectopy noted. Normal axis RBBB. No ischemic changes noted.  Marylen Anda, MD

## 2017-12-06 ENCOUNTER — PATIENT OUTREACH (OUTPATIENT)
Dept: INTERNAL MEDICINE CLINIC | Age: 67
End: 2017-12-06

## 2017-12-06 LAB
ATRIAL RATE: 82 BPM
CALCULATED P AXIS, ECG09: 62 DEGREES
CALCULATED R AXIS, ECG10: 11 DEGREES
CALCULATED T AXIS, ECG11: -4 DEGREES
DIAGNOSIS, 93000: NORMAL
P-R INTERVAL, ECG05: 140 MS
Q-T INTERVAL, ECG07: 396 MS
QRS DURATION, ECG06: 110 MS
QTC CALCULATION (BEZET), ECG08: 462 MS
VENTRICULAR RATE, ECG03: 82 BPM

## 2017-12-06 RX ORDER — ALBUTEROL SULFATE 0.83 MG/ML
2.5 SOLUTION RESPIRATORY (INHALATION)
Qty: 100 EACH | Refills: 11 | Status: SHIPPED | OUTPATIENT
Start: 2017-12-06

## 2017-12-06 NOTE — CALL BACK NOTE
[facility] Emergency Department Follow Up Call Record    Discharged to : Home/Family Home/Home Health/Skilled Facility/Rehab/Assisted Living/Other__Home_____  1) Did you receive your discharge instructions? Yes  verfied with patient. 2) Do you understand them? Yes         3) Are you able to follow them? Yes          If NO, what can I clarify for you? 4) Do you understand your diagnosis? Yes         5) Do you know which symptoms should prompt you to call the doctor? Yes     6) Were you able to fill and  any medications that were prescribed? Yes     7) You were prescribed __tessalon pearls, ceftin_________for _bronchitis___________________. Common side effects of this medication are__rash__________________. This is not a complete list so please review the forms given from the pharmacy for a complete list.      8) Are there any questions about your medications? No            Have you scheduled any recommended doctors appointments (specialty, PCP) NO. Patient will call her PCP for appointment. If NO, what barriers are you encountering (transportation/lost contact info/cost/  didnt think necessary/no PCP    Earl Shin does report having some difficulty reaching  at Dr. Mally Madrigal office to make appointment. She will try again later. 9) If discharged with Home Health, has the agency contacted you to schedule visit? No  10) Is there anyone available to help you at home (meals, errands, transportation    monitoring) (adult children, neighbors, private duty companions) Yes    11) Are you on a special diet? Yes  DM         If YES, do you understand the requirements for this diet? YES       Education provided? 12) If presented with cough, bronchitis, COPD, asthma, is it ok to ask that the   respiratory disease management educator call you? Not applicable      13)  A) If presented with fall, were you issued an assistive device in the ED    Are you using? Not applicable  B) If given RX for device, have you obtained? Not applicable       If NO, barriers? C) Therapist recommended:NO   Are you able to implement the suggestions? Not applicable        If NO, barriers to implementation? D) Are you having any difficulties with mobility inside your home?     (steps, bed, tub)No   If YES, ask if the [facility] PT can contact patient and good time and number?  14)  At the end of your discharge instructions, there is information about accessing [facility], have you had a chance to review those? Yes         Do you have any questions about signing up for this service? We encourage our patients to be active participants in their healthcare and this site is one of the ways to do that. It will allow you to access parts of your medical record, email your doctors office, schedule appointments, and request medications refills . 15) Are there any other questions that I can answer for you regarding    your Emergency department visit?  NO             Estimated Call Time:_____12:48 PM  ______________ Date/Time:_______________

## 2017-12-06 NOTE — PROGRESS NOTES
[facility] [address] [place] [address] 2017:  SOB/Acute bronchitis, unspecified organism    Hospital Discharge Follow-Up    Date/Time:  2017 4:52 PM    Patient listed on discharge Daily Census report on 2017. Patient discharged from [facility] for SOB/Acute Bronchitis. RRAT score: Low Risk            3       Total Score        3 Charlson Comorbidity Score (Age + Comorbid Conditions)        Criteria that do not apply:    Has Seen PCP in Last 6 Months (Yes=3, No=0)    . Living with Significant Other. Assisted Living. LTAC. SNF. or   Rehab    Patient Length of Stay (>5 days = 3)    IP Visits Last 12 Months (1-3=4, 4=9, >4=11)    Pt. Coverage (Medicare=5 , Medicaid, or Self-Pay=4)     Low    Medical History:     Past Medical History:   Diagnosis Date    Asthma     Diabetes ([facility] [address]     Hypercholesterolemia     Hypertension        Nurse Navigator(NN) contacted the patient by telephone to perform post [address] [place] [address] ED discharge assessment. Verified  and address with patient as identifiers. Provided introduction to self, and explanation of the Nurses Navigator role. Diet:   Patient reports: Diabetic Diet & Renal Diet    Activity:    Patient reports: mostly moving around the house    Medication:   Performed medication reconciliation with patient, and patient verbalizes understanding of administration of home medications. There were no barriers to obtaining medications identified at this time. Support system:  patient and daughter    Discharge Instructions :  Reviewed discharge instructions with patient. Patient verbalizes understanding of discharge instructions and follow-up care. Red Flags: SOB.   Cough uncontrollable    Labs Reviewed:  Recent Labs      17   1355   WBC  7.0   HGB  12.3   HCT  37.7   PLT  190     Recent Labs      17   1355   NA  141   K  3.8   CL  106   CO2  28   GLU  91   BUN  12   CREA  0.67   CA  9.6   ALB  3.6   SGOT  8*   ALT  20     Imaging results reviewed:  Chest: IMPRESSION: No acute cardiopulmonary disease.     Advance Care Planning:   Patient was offered the opportunity to discuss advance care planning:  yes     Does patient have an Advance Directive:  no   If no, did you provide information on Caring Connections? yes     PCP/Specialist follow up: Patient scheduled to follow up with Ric Javier MD on 12/8/2017. Reviewed red flags with patient, and patient verbalizes understanding. Case management Impression/ plan:    Goals Addressed      Knowledge and adherence of prescribed medication (ie. action, side effects, missed dose, etc.). Patient stated she was informed during ED visit to start back on asthma medications & nebulizer. Stated she needed solution for nebulizer. NN had albuterol 2.5mg/0.08% solution ordered. Patient stated daughter w/ .          Goal completion 12/8/2017

## 2017-12-08 ENCOUNTER — OFFICE VISIT (OUTPATIENT)
Dept: INTERNAL MEDICINE CLINIC | Age: 67
End: 2017-12-08

## 2017-12-08 VITALS
DIASTOLIC BLOOD PRESSURE: 79 MMHG | HEIGHT: 57 IN | BODY MASS INDEX: 33.63 KG/M2 | TEMPERATURE: 97.6 F | RESPIRATION RATE: 16 BRPM | SYSTOLIC BLOOD PRESSURE: 122 MMHG | OXYGEN SATURATION: 99 % | WEIGHT: 155.9 LBS | HEART RATE: 73 BPM

## 2017-12-08 DIAGNOSIS — J45.41 MODERATE PERSISTENT REACTIVE AIRWAY DISEASE WITH ACUTE EXACERBATION: Primary | ICD-10-CM

## 2017-12-08 DIAGNOSIS — I10 ESSENTIAL HYPERTENSION: ICD-10-CM

## 2017-12-08 DIAGNOSIS — E78.5 DYSLIPIDEMIA: ICD-10-CM

## 2017-12-08 DIAGNOSIS — M54.12 CERVICAL RADICULOPATHY: ICD-10-CM

## 2017-12-08 DIAGNOSIS — E11.9 TYPE 2 DIABETES MELLITUS WITHOUT COMPLICATION, WITHOUT LONG-TERM CURRENT USE OF INSULIN (HCC): ICD-10-CM

## 2017-12-08 RX ORDER — PREDNISONE 20 MG/1
80 TABLET ORAL
Qty: 20 TAB | Refills: 0 | Status: SHIPPED | OUTPATIENT
Start: 2017-12-08 | End: 2018-03-24 | Stop reason: CLARIF

## 2017-12-08 NOTE — PROGRESS NOTES
Chief Complaint   Patient presents with   [facility] ED Follow-up     chest pain and shortness of breath on 12/5/17     1. Have you been to the ER, urgent care clinic since your last visit? Hospitalized since your last visit? Yes Reason for visit: chest pain and shortness of breat on 12/5/17    2. Have you seen or consulted any other health care providers outside of the [address] [place] [address] since your last visit? Include any pap smears or colon screening.  No

## 2017-12-08 NOTE — MR AVS SNAPSHOT
Visit Information Date & Time Provider Department Dept. Phone Encounter #  
 12/8/2017  1:00 PM Alli Marie [facility] Sports Medicine [facility] [phone] [id number] Your Appointments 12/18/2017  2:45 PM  
Any with Gianna Caro MD  
SPORTS MED AND PRIMARY CARE - Nikita Pack (Makenziekorin Malone) Appt Note: 4 month f/u  
 [address], [facility] 562 [facility] 98  
  
   
 [address], [facility] Upcoming Health Maintenance Date Due  
 EYE EXAM RETINAL OR DILATED Q1 5/13/2016 FOBT Q 1 YEAR AGE 50-75 5/2/2017 HEMOGLOBIN A1C Q6M 2/28/2018 MICROALBUMIN Q1 5/15/2018 LIPID PANEL Q1 5/15/2018 MEDICARE YEARLY EXAM 5/16/2018 Pneumococcal 65+ Low/Medium Risk (2 of 2 - PPSV23) 8/28/2018 FOOT EXAM Q1 8/28/2018 GLAUCOMA SCREENING Q2Y 8/28/2019 BREAST CANCER SCRN MAMMOGRAM 8/28/2019 DTaP/Tdap/Td series (2 - Td) 8/28/2027 Allergies as of 12/8/2017  Review Complete On: 12/8/2017 By: Дмитрий Beyer Severity Noted Reaction Type Reactions Percocet [Oxycodone-acetaminophen]  10/08/2014    Rash, Itching Current Immunizations  Never Reviewed No immunizations on file. Not reviewed this visit You Were Diagnosed With   
  
 Codes Comments Moderate persistent reactive airway disease with acute exacerbation    -  Primary ICD-10-CM: J45.41 
ICD-9-CM: 493.92 Cervical radiculopathy     ICD-10-CM: M54.12 
ICD-9-CM: 723.4 Type 2 diabetes mellitus without complication, without long-term current use of insulin (HCC)     ICD-10-CM: E11.9 ICD-9-CM: 250.00 Essential hypertension     ICD-10-CM: I10 
ICD-9-CM: 401.9 Vitals BP Pulse Temp Resp Height(growth percentile) Weight(growth percentile) 122/79 (BP 1 Location: Left arm, BP Patient Position: Sitting) 73 97.6 °F (36.4 °C) (Oral) 16 4' 9\" (1.448 m) 155 lb 14.4 oz (70.7 kg) SpO2 BMI OB Status Smoking Status 99% 33.74 kg/m2 Hysterectomy Never Smoker Vitals History BMI and BSA Data Body Mass Index Body Surface Area 33.74 kg/m 2 1.69 m 2 Preferred Pharmacy Pharmacy Name Phone [facility] [address], [address] [place] [address] [phone] Your Updated Medication List  
  
   
This list is accurate as of: 12/8/17  2:45 PM.  Always use your most recent med list.  
  
  
  
  
 albuterol 2.5 mg /3 mL (0.083 %) nebulizer solution Commonly known as:  PROVENTIL VENTOLIN  
3 mL by Nebulization route every four (4) hours as needed for Wheezing. amLODIPine 10 mg tablet Commonly known as:  Horris Bills TAKE 1 TABLET BY MOUTH ONCE A DAY FOR 90 DAYS  
  
 benzonatate 100 mg capsule Commonly known as:  TESSALON PERLES Take 1 Cap by mouth three (3) times daily as needed for Cough for up to 7 days. cefUROXime 500 mg tablet Commonly known as:  CEFTIN Take 1 Tab by mouth two (2) times a day for 7 days. dorzolamide 2 % ophthalmic solution Commonly known as:  TRUSOPT  
  
 gabapentin 100 mg capsule Commonly known as:  NEURONTIN  
TAKE 1 CAPSULE BY MOUTH THREE TIMES DAILY JANUVIA 100 mg tablet Generic drug:  SITagliptin Take 100 mg by mouth daily. lisinopril-hydroCHLOROthiazide 20-12.5 mg per tablet Commonly known as:  PRINZIDE, ZESTORETIC  
TAKE 1 TABLET BY MOUTH DAILY FOR 90 DAYS  
  
 metFORMIN  mg tablet Commonly known as:  GLUCOPHAGE XR  
TAKE 2 TABLETS BY MOUTH TWICE DAILY NexIUM 40 mg capsule Generic drug:  esomeprazole  
  
 potassium chloride 20 mEq tablet Commonly known as:  K-DUR, KLOR-CON  
TAKE 1 TABLET BY MOUTH DAILY pravastatin 80 mg tablet Commonly known as:  PRAVACHOL  
TAKE 1 TABLET BY MOUTH EVERY NIGHT AT BEDTIME  
  
 predniSONE 20 mg tablet Commonly known as:  Gloria Marlene Take 4 Tabs by mouth daily (after dinner). traMADol 50 mg tablet Commonly known as:  ULTRAM  
Take 1 Tab by mouth every six (6) hours as needed for Pain. Max Daily Amount: 200 mg. TRAVATAN Z 0.004 % ophthalmic solution Generic drug:  travoprost  
  
  
  
  
Prescriptions Sent to Pharmacy Refills  
 predniSONE (DELTASONE) 20 mg tablet 0 Sig: Take 4 Tabs by mouth daily (after dinner). Class: Normal  
 Pharmacy: [facility] [address] [place] [address] #: [phone] Route: Oral  
  
We Performed the Following HEMOGLOBIN A1C WITH EAG [03728 CPT(R)] METABOLIC PANEL, BASIC [53727 CPT(R)] Introducing [facility]! Jakob Hogan introduces [employer] patient portal. Now you can access parts of your medical record, email your doctor's office, and request medication refills online. 1. In your internet browser, go to [email or web address] 2. Click on the First Time User? Click Here link in the Sign In box. You will see the New Member Sign Up page. 3. Enter your [employer] Access Code exactly as it appears below. You will not need to use this code after youve completed the sign-up process. If you do not sign up before the expiration date, you must request a new code. · [employer] Access Code: [email or web address] Expires: 3/5/2018  6:13 PM 
 
4. Enter the last four digits of your Social Security Number (xxxx) and Date of Birth (mm/dd/yyyy) as indicated and click Submit. You will be taken to the next sign-up page. 5. Create a [employer] ID. This will be your [employer] login ID and cannot be changed, so think of one that is secure and easy to remember. 6. Create a [employer] password. You can change your password at any time. 7. Enter your Password Reset Question and Answer. This can be used at a later time if you forget your password. 8. Enter your e-mail address. You will receive e-mail notification when new information is available in [address] 9. Click Sign Up. You can now view and download portions of your medical record. 10. Click the Download Summary menu link to download a portable copy of your medical information. If you have questions, please visit the Frequently Asked Questions section of the [employer] website. Remember, [employer] is NOT to be used for urgent needs. For medical emergencies, dial 911. Now available from your iPhone and Android! Please provide this summary of care documentation to your next provider. Your primary care clinician is listed as Jeovany Aguirre. If you have any questions after today's visit, please call [phone].

## 2017-12-09 LAB
BUN SERPL-MCNC: 12 MG/DL (ref 8–27)
BUN/CREAT SERPL: 16 (ref 12–28)
CALCIUM SERPL-MCNC: 10.5 MG/DL (ref 8.7–10.3)
CHLORIDE SERPL-SCNC: 105 MMOL/L (ref 96–106)
CO2 SERPL-SCNC: 27 MMOL/L (ref 18–29)
CREAT SERPL-MCNC: 0.77 MG/DL (ref 0.57–1)
EST. AVERAGE GLUCOSE BLD GHB EST-MCNC: 131 MG/DL
GFR SERPLBLD CREATININE-BSD FMLA CKD-EPI: 80 ML/MIN/1.73
GFR SERPLBLD CREATININE-BSD FMLA CKD-EPI: 92 ML/MIN/1.73
GLUCOSE SERPL-MCNC: 81 MG/DL (ref 65–99)
HBA1C MFR BLD: 6.2 % (ref 4.8–5.6)
POTASSIUM SERPL-SCNC: 4.2 MMOL/L (ref 3.5–5.2)
SODIUM SERPL-SCNC: 146 MMOL/L (ref 134–144)

## 2017-12-09 NOTE — PROGRESS NOTES
[address] [place] [address] and Primary Care  [place] [address]  [address] [place] [address]  Phone:  [phone]  Fax: [phone]       Chief Complaint   Patient presents with   [facility] ED Follow-up     chest pain and shortness of breath on 12/5/17   . SUBJECTIVE:    Steff Powell is a 75 y.o. female Comes in for return visit having gone to the emergency room because of increasing shortness of breath, audible wheezing and coughing. The cough is nonproductive at this point. She remains symptomatic with a significant amount of nocturnal coughing. She also has pain in her neck radiating into the left side of her neck into her shoulder. This has been present for the last 1-2 weeks and is getting progressively worse. There has been no antecedent history of trauma. There is a past history of diabetes mellitus and primary hypertension. The former disease entity is doing quite well based on her last hemoglobin A1c. Current Outpatient Prescriptions   Medication Sig Dispense Refill    predniSONE (DELTASONE) 20 mg tablet Take 4 Tabs by mouth daily (after dinner). 20 Tab 0    albuterol (PROVENTIL VENTOLIN) 2.5 mg /3 mL (0.083 %) nebulizer solution 3 mL by Nebulization route every four (4) hours as needed for Wheezing. 100 Each 11    benzonatate (TESSALON PERLES) 100 mg capsule Take 1 Cap by mouth three (3) times daily as needed for Cough for up to 7 days. 30 Cap 0    cefUROXime (CEFTIN) 500 mg tablet Take 1 Tab by mouth two (2) times a day for 7 days. 14 Tab 0    pravastatin (PRAVACHOL) 80 mg tablet TAKE 1 TABLET BY MOUTH EVERY NIGHT AT BEDTIME 90 Tab 3    metFORMIN ER (GLUCOPHAGE XR) 500 mg tablet TAKE 2 TABLETS BY MOUTH TWICE DAILY 360 Tab 0    traMADol (ULTRAM) 50 mg tablet Take 1 Tab by mouth every six (6) hours as needed for Pain.  Max Daily Amount: 200 mg. 50 Tab 0    potassium chloride (K-DUR, KLOR-CON) 20 mEq tablet TAKE 1 TABLET BY MOUTH DAILY 90 Tab 3    gabapentin (NEURONTIN) 100 mg capsule TAKE 1 CAPSULE BY MOUTH THREE TIMES DAILY 270 Cap 3    lisinopril-hydroCHLOROthiazide (PRINZIDE, ZESTORETIC) 20-12.5 mg per tablet TAKE 1 TABLET BY MOUTH DAILY FOR 90 DAYS 90 Tab 3    amLODIPine (NORVASC) 10 mg tablet TAKE 1 TABLET BY MOUTH ONCE A DAY FOR 90 DAYS 90 Tab 3    dorzolamide (TRUSOPT) 2 % ophthalmic solution       NEXIUM 40 mg capsule       TRAVATAN Z 0.004 % ophthalmic solution       sitagliptin (JANUVIA) 100 mg tablet Take 100 mg by mouth daily.        Past Medical History:   Diagnosis Date    Asthma     Diabetes ([facility] [address]     Hypercholesterolemia     Hypertension      Past Surgical History:   Procedure Laterality Date    HX COLONOSCOPY      HX GYN      status post hysterectomy,BSO,and C-sections x3    HX ORTHOPAEDIC      Arthroscopic surgery for both knees     Allergies   Allergen Reactions    Percocet [Oxycodone-Acetaminophen] Rash and Itching         REVIEW OF SYSTEMS:  General: negative for - chills or fever  ENT: negative for - headaches, nasal congestion or tinnitus  Respiratory: negative for - cough, hemoptysis, shortness of breath or wheezing  Cardiovascular : negative for - chest pain, edema, palpitations or shortness of breath  Gastrointestinal: negative for - abdominal pain, blood in stools, heartburn or nausea/vomiting  Genito-Urinary: no dysuria, trouble voiding, or hematuria  Musculoskeletal: negative for - gait disturbance, joint pain, joint stiffness or joint swelling  Neurological: no TIA or stroke symptoms  Hematologic: no bruises, no bleeding, no swollen glands  Integument: no lumps, mole changes, nail changes or rash  Endocrine: no malaise/lethargy or unexpected weight changes      Social History     Social History    Marital status: SINGLE     Spouse name: N/A    Number of children: N/A    Years of education: N/A     Occupational History    retired [employer]      Social History Main Topics    Smoking status: Never Smoker  Smokeless tobacco: Never Used    Alcohol use 0.5 oz/week     1 Cans of beer per week    Drug use: No    Sexual activity: Not Asked     Other Topics Concern    None     Social History Narrative     Family History   Problem Relation Age of Onset    Cancer Mother     Lung Disease Father     Cancer Brother        OBJECTIVE:    Visit Vitals    /79 (BP 1 Location: Left arm, BP Patient Position: Sitting)    Pulse 73    Temp 97.6 °F (36.4 °C) (Oral)    Resp 16    Ht 4' 9\" (1.448 m)    Wt 155 lb 14.4 oz (70.7 kg)    SpO2 99%    BMI 33.74 kg/m2     CONSTITUTIONAL: well , well nourished, appears age appropriate  EYES: perrla, eom intact  ENMT:moist mucous membranes, pharynx clear  NECK: supple. Thyroid normal  RESPIRATORY: Chest: Bilateral fine expiratory rhonchi both lung fields  CARDIOVASCULAR: Heart: regular rate and rhythm  GASTROINTESTINAL: Abdomen: soft, bowel sounds active  HEMATOLOGIC: no pathological lymph nodes palpated  MUSCULOSKELETAL: Extremities: no edema, pulse 1+   INTEGUMENT: No unusual rashes or suspicious skin lesions noted. Nails appear normal.  NEUROLOGIC: non-focal exam   MENTAL STATUS: alert and oriented, appropriate affect      ASSESSMENT:  1. Moderate persistent reactive airway disease with acute exacerbation    2. Cervical radiculopathy    3. Type 2 diabetes mellitus without complication, without long-term current use of insulin ([address]    4. Essential hypertension    5. Dyslipidemia        PLAN:    1. The patient has significant reactive airway disease. We will give her prednisone for this. She will also use an inhaler, Dulera two puffs BID. There is no bronchitic component. 2. She also has a cervical radiculopathy, quite symptomatic. The systemic steroids that I am giving her for her reactive airway disease should also help with this particular disease entity. She will be given 80 mg PC dinner for the next five days.    3. She will check her blood sugars on a regular basis in view of the systemic steroids. Hopefully she can indeed check them. If not, if she notices increased urinary frequency, she is to give me a call. 4. Blood pressure is excellent today, no adjustments are made. 5. She also remains on a statin in view of her primary cardiovascular risk prevention status. .  Orders Placed This Encounter    HEMOGLOBIN A1C WITH EAG    METABOLIC PANEL, BASIC    predniSONE (DELTASONE) 20 mg tablet         Follow-up Disposition:  Return in about 1 week (around 12/15/2017).       Stephania Thapa MD

## 2017-12-13 ENCOUNTER — PATIENT OUTREACH (OUTPATIENT)
Dept: INTERNAL MEDICINE CLINIC | Age: 67
End: 2017-12-13

## 2017-12-13 NOTE — PROGRESS NOTES
[facility] [place] 12/5/2017:  SOB/Acute bronchitis, unspecified organism    NN received call from patient c/o having received call for appointment on tomorrow-12/14 but the only time she can come is Friday at 4:00PM due to daughter having obtained approval for getting off to bring her to the office. According to [place] review, patient is scheduled for Thursday and then Monday. Consult done w/ Office Mgr Angelica; stated she will contact daughter Ruslan Barney as requested by patient for appointment verification on cell # provided. Patient denied respiratory problems: no SOB, wheezing or cough today. Patient stated pain in neck is better but still bothering her and w/ discuss same w/ PCP on Friday. NN continue to f/u for post ED assessment/f/u. Goals Addressed             Most Recent     Knowledge and adherence of prescribed medication (ie. action, side effects, missed dose, etc.). On track (12/13/2017)             Patient stated she was informed during ED visit to start back on asthma medications & nebulizer. Stated she needed solution for nebulizer. NN had albuterol 2.5mg/0.08% solution ordered. Patient stated daughter w/ . Goal completed. Patient completed post ED PCP office visit 12/8/2017.

## 2017-12-14 ENCOUNTER — PATIENT OUTREACH (OUTPATIENT)
Dept: INTERNAL MEDICINE CLINIC | Age: 67
End: 2017-12-14
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
## 2018-03-23 ENCOUNTER — OFFICE VISIT (OUTPATIENT)
Dept: INTERNAL MEDICINE CLINIC | Age: 68
End: 2018-03-23

## 2018-03-23 VITALS
RESPIRATION RATE: 16 BRPM | HEART RATE: 89 BPM | BODY MASS INDEX: 34.11 KG/M2 | SYSTOLIC BLOOD PRESSURE: 114 MMHG | DIASTOLIC BLOOD PRESSURE: 71 MMHG | WEIGHT: 158.1 LBS | HEIGHT: 57 IN | TEMPERATURE: 97.7 F | OXYGEN SATURATION: 99 %

## 2018-03-23 DIAGNOSIS — E78.5 DYSLIPIDEMIA: ICD-10-CM

## 2018-03-23 DIAGNOSIS — M17.10 PRIMARY OSTEOARTHRITIS OF KNEE, UNSPECIFIED LATERALITY: ICD-10-CM

## 2018-03-23 DIAGNOSIS — R19.5 OCCULT BLOOD IN STOOLS: ICD-10-CM

## 2018-03-23 DIAGNOSIS — E66.9 OBESITY (BMI 30.0-34.9): ICD-10-CM

## 2018-03-23 DIAGNOSIS — E11.9 TYPE 2 DIABETES MELLITUS WITHOUT COMPLICATION, WITHOUT LONG-TERM CURRENT USE OF INSULIN (HCC): ICD-10-CM

## 2018-03-23 DIAGNOSIS — Z01.818 PREOPERATIVE EXAMINATION: Primary | ICD-10-CM

## 2018-03-23 DIAGNOSIS — I10 ESSENTIAL HYPERTENSION: ICD-10-CM

## 2018-03-23 NOTE — PROGRESS NOTES
Chief Complaint   Patient presents with    Pre-op Exam     Patient has surgery scheduled for 3/30/18     1. Have you been to the ER, urgent care clinic since your last visit? Hospitalized since your last visit? No    2. Have you seen or consulted any other health care providers outside of the [facility] since your last visit? Include any pap smears or colon screening.  No

## 2018-03-23 NOTE — MR AVS SNAPSHOT
[address] [place], [place] [address]Vinayak Chavira [address] [address] 
[phone] Patient: Poncho Records MRN: [id number] Visit Information Date & Time Provider Department Dept. Phone Encounter #  
 3/23/2018 12:15 PM MD Evert Goyal Sports Medicine [facility] Primary Care [phone] Upcoming Health Maintenance Date Due FOBT Q 1 YEAR AGE 50-75 5/2/2017 MICROALBUMIN Q1 5/15/2018 LIPID PANEL Q1 5/15/2018 MEDICARE YEARLY EXAM 5/16/2018 HEMOGLOBIN A1C Q6M 6/8/2018 Pneumococcal 65+ Low/Medium Risk (2 of 2 - PPSV23) 8/28/2018 FOOT EXAM Q1 8/28/2018 GLAUCOMA SCREENING Q2Y 8/28/2019 BREAST CANCER SCRN MAMMOGRAM 8/28/2019 DTaP/Tdap/Td series (2 - Td) 8/28/2027 Allergies as of 3/23/2018  Review Complete On: 3/23/2018 By: Anoop Lab Severity Noted Reaction Type Reactions Percocet [Oxycodone-acetaminophen]  10/08/2014    Rash, Itching Current Immunizations  Never Reviewed No immunizations on file. Not reviewed this visit You Were Diagnosed With   
  
 Codes Comments Preoperative examination    -  Primary ICD-10-CM: H73.787 ICD-9-CM: V72.84 Type 2 diabetes mellitus without complication, without long-term current use of insulin (HCC)     ICD-10-CM: E11.9 ICD-9-CM: 250.00 Essential hypertension     ICD-10-CM: I10 
ICD-9-CM: 401.9 Dyslipidemia     ICD-10-CM: E78.5 ICD-9-CM: 272.4 Occult blood in stools     ICD-10-CM: R19.5 ICD-9-CM: 792.1 Vitals BP Pulse Temp Resp Height(growth percentile) Weight(growth percentile) 114/71 (BP 1 Location: Right arm, BP Patient Position: Sitting) 89 97.7 °F (36.5 °C) (Oral) 16 4' 9\" (1.448 m) 158 lb 1.6 oz (71.7 kg) SpO2 BMI OB Status Smoking Status 99% 34.21 kg/m2 Hysterectomy Never Smoker Vitals History BMI and BSA Data  Body Mass Index Body Surface Area  
 34.21 kg/m 2 1.7 m 2  
  
  
 Preferred Pharmacy Pharmacy Name Phone [facility] [address], [address] [facility] [phone] Your Updated Medication List  
  
   
This list is accurate as of 3/23/18  2:35 PM.  Always use your most recent med list.  
  
  
  
  
 albuterol 2.5 mg /3 mL (0.083 %) nebulizer solution Commonly known as:  PROVENTIL VENTOLIN  
3 mL by Nebulization route every four (4) hours as needed for Wheezing. amLODIPine 10 mg tablet Commonly known as:  Rulon Diane TAKE 1 TABLET BY MOUTH ONCE A DAY FOR 90 DAYS  
  
 dorzolamide 2 % ophthalmic solution Commonly known as:  TRUSOPT  
  
 gabapentin 100 mg capsule Commonly known as:  NEURONTIN  
TAKE ONE CAPSULE BY MOUTH THREE TIMES DAILY JANUVIA 100 mg tablet Generic drug:  SITagliptin Take 100 mg by mouth daily. lisinopril-hydroCHLOROthiazide 20-12.5 mg per tablet Commonly known as:  PRINZIDE, ZESTORETIC  
TAKE 1 TABLET BY MOUTH DAILY FOR 90 DAYS  
  
 metFORMIN  mg tablet Commonly known as:  GLUCOPHAGE XR  
TAKE 2 TABLETS BY MOUTH TWICE DAILY NexIUM 40 mg capsule Generic drug:  esomeprazole  
  
 potassium chloride 20 mEq tablet Commonly known as:  K-DUR, KLOR-CON  
TAKE 1 TABLET BY MOUTH DAILY pravastatin 80 mg tablet Commonly known as:  PRAVACHOL  
TAKE 1 TABLET BY MOUTH EVERY NIGHT AT BEDTIME  
  
 predniSONE 20 mg tablet Commonly known as:  Cherri Karli Take 4 Tabs by mouth daily (after dinner). traMADol 50 mg tablet Commonly known as:  ULTRAM  
Take 1 Tab by mouth every six (6) hours as needed for Pain. Max Daily Amount: 200 mg. TRAVATAN Z 0.004 % ophthalmic solution Generic drug:  travoprost  
  
  
  
  
We Performed the Following AMB POC EKG ROUTINE W/ 12 LEADS, INTER & REP [65388 CPT(R)] APOLIPOPROTEIN B [id number] CPT(R)] CBC WITH AUTOMATED DIFF [30479 CPT(R)] COLLECTION VENOUS BLOOD,VENIPUNCTURE [id number] CPT(R)] HEMOGLOBIN A1C WITH EAG [19272 CPT(R)] LIPID PANEL [03122 CPT(R)] METABOLIC PANEL, COMPREHENSIVE [95097 CPT(R)] MICROALBUMIN, UR, RAND W/ MICROALB/CREAT RATIO [id number] CPT(R)] OCCULT BLOOD, IMMUNOASSAY (FIT) [id number] CPT(R)] TSH 3RD GENERATION [65688 CPT(R)] URINALYSIS W/ RFLX MICROSCOPIC [25351 CPT(R)] To-Do List   
 03/23/2018 Imaging:  XR CHEST PA LAT Introducing \[facility]\"" & HEALTH SERVICES! Elsa Devante introduces [employer] patient portal. Now you can access parts of your medical record, email your doctor's office, and request medication refills online. 1. In your internet browser, go to [email or web address] 2. Click on the First Time User? Click Here link in the Sign In box. You will see the New Member Sign Up page. 3. Enter your [employer] Access Code exactly as it appears below. You will not need to use this code after youve completed the sign-up process. If you do not sign up before the expiration date, you must request a new code. · [employer] Access Code: [email or web address] Expires: 6/21/2018  2:35 PM 
 
4. Enter the last four digits of your Social Security Number (xxxx) and Date of Birth (mm/dd/yyyy) as indicated and click Submit. You will be taken to the next sign-up page. 5. Create a [employer] ID. This will be your [employer] login ID and cannot be changed, so think of one that is secure and easy to remember. 6. Create a [employer] password. You can change your password at any time. 7. Enter your Password Reset Question and Answer. This can be used at a later time if you forget your password. 8. Enter your e-mail address. You will receive e-mail notification when new information is available in [address] 9. Click Sign Up. You can now view and download portions of your medical record. 10. Click the Download Summary menu link to download a portable copy of your medical information.  
 
If you have questions, please visit the Frequently Asked Questions section of the [employer]. Remember, [employer] is NOT to be used for urgent needs. For medical emergencies, dial 911. Now available from your iPhone and Android! Please provide this summary of care documentation to your next provider. Your primary care clinician is listed as Jeovany Aguirre. If you have any questions after today's visit, please call [phone].

## 2018-03-24 PROBLEM — E66.9 OBESITY (BMI 30.0-34.9): Status: ACTIVE | Noted: 2018-03-24

## 2018-03-24 PROBLEM — I45.10 RIGHT BUNDLE BRANCH BLOCK: Status: ACTIVE | Noted: 2018-03-24

## 2018-03-24 LAB
ALBUMIN SERPL-MCNC: 4.6 G/DL (ref 3.6–4.8)
ALBUMIN/CREAT UR: 8.7 MG/G CREAT (ref 0–30)
ALBUMIN/GLOB SERPL: 1.7 {RATIO} (ref 1.2–2.2)
ALP SERPL-CCNC: 77 IU/L (ref 39–117)
ALT SERPL-CCNC: 17 IU/L (ref 0–32)
APO B SERPL-MCNC: 86 MG/DL (ref 54–133)
APPEARANCE UR: CLEAR
AST SERPL-CCNC: 18 IU/L (ref 0–40)
BASOPHILS # BLD AUTO: 0 X10E3/UL (ref 0–0.2)
BASOPHILS NFR BLD AUTO: 0 %
BILIRUB SERPL-MCNC: 0.4 MG/DL (ref 0–1.2)
BILIRUB UR QL STRIP: NEGATIVE
BUN SERPL-MCNC: 18 MG/DL (ref 8–27)
BUN/CREAT SERPL: 23 (ref 12–28)
CALCIUM SERPL-MCNC: 10.3 MG/DL (ref 8.7–10.3)
CHLORIDE SERPL-SCNC: 103 MMOL/L (ref 96–106)
CHOLEST SERPL-MCNC: 164 MG/DL (ref 100–199)
CO2 SERPL-SCNC: 26 MMOL/L (ref 18–29)
COLOR UR: YELLOW
CREAT SERPL-MCNC: 0.77 MG/DL (ref 0.57–1)
CREAT UR-MCNC: 143.7 MG/DL
EOSINOPHIL # BLD AUTO: 0.1 X10E3/UL (ref 0–0.4)
EOSINOPHIL NFR BLD AUTO: 1 %
ERYTHROCYTE [DISTWIDTH] IN BLOOD BY AUTOMATED COUNT: 13.7 % (ref 12.3–15.4)
EST. AVERAGE GLUCOSE BLD GHB EST-MCNC: 137 MG/DL
GFR SERPLBLD CREATININE-BSD FMLA CKD-EPI: 80 ML/MIN/1.73
GFR SERPLBLD CREATININE-BSD FMLA CKD-EPI: 92 ML/MIN/1.73
GLOBULIN SER CALC-MCNC: 2.7 G/DL (ref 1.5–4.5)
GLUCOSE SERPL-MCNC: 66 MG/DL (ref 65–99)
GLUCOSE UR QL: NEGATIVE
HBA1C MFR BLD: 6.4 % (ref 4.8–5.6)
HCT VFR BLD AUTO: 38.1 % (ref 34–46.6)
HDLC SERPL-MCNC: 55 MG/DL
HGB BLD-MCNC: 12.6 G/DL (ref 11.1–15.9)
HGB UR QL STRIP: NEGATIVE
IMM GRANULOCYTES # BLD: 0 X10E3/UL (ref 0–0.1)
IMM GRANULOCYTES NFR BLD: 0 %
KETONES UR QL STRIP: NEGATIVE
LDLC SERPL CALC-MCNC: 67 MG/DL (ref 0–99)
LEUKOCYTE ESTERASE UR QL STRIP: NEGATIVE
LYMPHOCYTES # BLD AUTO: 5 X10E3/UL (ref 0.7–3.1)
LYMPHOCYTES NFR BLD AUTO: 63 %
MCH RBC QN AUTO: 27.6 PG (ref 26.6–33)
MCHC RBC AUTO-ENTMCNC: 33.1 G/DL (ref 31.5–35.7)
MCV RBC AUTO: 83 FL (ref 79–97)
MICRO URNS: NORMAL
MICROALBUMIN UR-MCNC: 12.5 UG/ML
MONOCYTES # BLD AUTO: 0.5 X10E3/UL (ref 0.1–0.9)
MONOCYTES NFR BLD AUTO: 6 %
NEUTROPHILS # BLD AUTO: 2.4 X10E3/UL (ref 1.4–7)
NEUTROPHILS NFR BLD AUTO: 30 %
NITRITE UR QL STRIP: NEGATIVE
PH UR STRIP: 5.5 [PH] (ref 5–7.5)
PLATELET # BLD AUTO: 261 X10E3/UL (ref 150–379)
POTASSIUM SERPL-SCNC: 4.2 MMOL/L (ref 3.5–5.2)
PROT SERPL-MCNC: 7.3 G/DL (ref 6–8.5)
PROT UR QL STRIP: NEGATIVE
RBC # BLD AUTO: 4.57 X10E6/UL (ref 3.77–5.28)
SODIUM SERPL-SCNC: 147 MMOL/L (ref 134–144)
SP GR UR: 1.02 (ref 1–1.03)
TRIGL SERPL-MCNC: 210 MG/DL (ref 0–149)
TSH SERPL DL<=0.005 MIU/L-ACNC: 0.92 UIU/ML (ref 0.45–4.5)
UROBILINOGEN UR STRIP-MCNC: 0.2 MG/DL (ref 0.2–1)
VLDLC SERPL CALC-MCNC: 42 MG/DL (ref 5–40)
WBC # BLD AUTO: 8 X10E3/UL (ref 3.4–10.8)

## 2018-03-24 NOTE — ASSESSMENT & PLAN NOTE
Stable, based on history, physical exam and review of pertinent labs, studies and medications; meds reconciled; lifestyle modifications recommended, medication compliance emphasized. Key Antihyperglycemic Medications             metFORMIN ER (GLUCOPHAGE XR) 500 mg tablet  (Taking) TAKE 2 TABLETS BY MOUTH TWICE DAILY    sitagliptin (JANUVIA) 100 mg tablet  (Taking) Take 100 mg by mouth daily.         Other Key Diabetic Medications             gabapentin (NEURONTIN) 100 mg capsule  (Taking) TAKE ONE CAPSULE BY MOUTH THREE TIMES DAILY    pravastatin (PRAVACHOL) 80 mg tablet  (Taking) TAKE 1 TABLET BY MOUTH EVERY NIGHT AT BEDTIME    lisinopril-hydroCHLOROthiazide (PRINZIDE, ZESTORETIC) 20-12.5 mg per tablet  (Taking) TAKE 1 TABLET BY MOUTH DAILY FOR 90 DAYS        Lab Results   Component Value Date/Time    Hemoglobin A1c 6.2 12/08/2017 02:38 PM    Glucose 81 12/08/2017 02:38 PM    Creatinine 0.77 12/08/2017 02:38 PM    Microalb/Creat ratio (ug/mg creat.) 5.2 05/15/2017 03:58 PM    Cholesterol, total 160 05/15/2017 03:58 PM    HDL Cholesterol 59 05/15/2017 03:58 PM    LDL, calculated 73 05/15/2017 03:58 PM    Triglyceride 140 05/15/2017 03:58 PM     Diabetic Foot and Eye Exam HM Status   Topic Date Due    Diabetic Foot Care  08/28/2018

## 2018-03-24 NOTE — PROGRESS NOTES
[address] [place] [address] and Primary Care  [place] [address]  [address]  [place] [address] [address]  Phone:  [phone]  Fax: [phone]    Chief Complaint   Patient presents with    Pre-op Exam     Patient has surgery scheduled for 3/30/18       SUBJECTIVE:    Robert Junior is a 75 y.o. female She comes in planning to have foot surgery by her podiatrist.  She needs preoperative workup. She has basically been doing well in general.      She has a past history of diabetes, which has indeed been doing quite well based on previous hemoglobin A1Cs. She does not check blood sugars, which is reasonable as long as her hemoglobin A1Cs remain stable. She has a past history of primary hypertension, dyslipidemia and osteoarthritis. The osteoarthritis predominately involves her knees, but this is quite stable. Current Outpatient Prescriptions   Medication Sig Dispense Refill    gabapentin (NEURONTIN) 100 mg capsule TAKE ONE CAPSULE BY MOUTH THREE TIMES DAILY 270 Cap 3    metFORMIN ER (GLUCOPHAGE XR) 500 mg tablet TAKE 2 TABLETS BY MOUTH TWICE DAILY 360 Tab 3    albuterol (PROVENTIL VENTOLIN) 2.5 mg /3 mL (0.083 %) nebulizer solution 3 mL by Nebulization route every four (4) hours as needed for Wheezing. 100 Each 11    pravastatin (PRAVACHOL) 80 mg tablet TAKE 1 TABLET BY MOUTH EVERY NIGHT AT BEDTIME 90 Tab 3    potassium chloride (K-DUR, KLOR-CON) 20 mEq tablet TAKE 1 TABLET BY MOUTH DAILY 90 Tab 3    lisinopril-hydroCHLOROthiazide (PRINZIDE, ZESTORETIC) 20-12.5 mg per tablet TAKE 1 TABLET BY MOUTH DAILY FOR 90 DAYS 90 Tab 3    amLODIPine (NORVASC) 10 mg tablet TAKE 1 TABLET BY MOUTH ONCE A DAY FOR 90 DAYS 90 Tab 3    dorzolamide (TRUSOPT) 2 % ophthalmic solution       NEXIUM 40 mg capsule       TRAVATAN Z 0.004 % ophthalmic solution       sitagliptin (JANUVIA) 100 mg tablet Take 100 mg by mouth daily.        Past Medical History:   Diagnosis Date    Asthma     [facility]     Hypercholesterolemia     Hypertension      Past Surgical History:   Procedure Laterality Date    HX COLONOSCOPY      HX GYN      status post hysterectomy,BSO,and C-sections x3    HX ORTHOPAEDIC      Arthroscopic surgery for both knees     Allergies   Allergen Reactions    Percocet [Oxycodone-Acetaminophen] Rash and Itching       REVIEW OF SYSTEMS:  General: negative for - chills or fever  ENT: negative for - headaches, nasal congestion or tinnitus  Respiratory: negative for - cough, hemoptysis, shortness of breath or wheezing  Cardiovascular : negative for - chest pain, edema, palpitations or shortness of breath  Gastrointestinal: negative for - abdominal pain, blood in stools, heartburn or nausea/vomiting  Genito-Urinary: no dysuria, trouble voiding, or hematuria  Musculoskeletal: negative for - gait disturbance, joint pain, joint stiffness or joint swelling  Neurological: no TIA or stroke symptoms  Hematologic: no bruises, no bleeding, no swollen glands  Integument: no lumps, mole changes, nail changes or rash  Endocrine:no malaise/lethargy or unexpected weight changes      Social History     Social History    Marital status: SINGLE     Spouse name: N/A    Number of children: N/A    Years of education: N/A     Occupational History    retired [employer]      Social History Main Topics    Smoking status: Never Smoker    Smokeless tobacco: Never Used    Alcohol use 0.5 oz/week     1 Cans of beer per week    Drug use: No    Sexual activity: Not Currently     Other Topics Concern    None     Social History Narrative     Family History   Problem Relation Age of Onset    Cancer Mother     Lung Disease Father     Cancer Brother        OBJECTIVE:     Visit Vitals    /71 (BP 1 Location: Right arm, BP Patient Position: Sitting)    Pulse 89    Temp 97.7 °F (36.5 °C) (Oral)    Resp 16    Ht 4' 9\" (1.448 m)    Wt 158 lb 1.6 oz (71.7 kg)    SpO2 99%    BMI 34.21 kg/m2 CONSTITUTIONAL: well , well nourished, appears age appropriate  EYES: perrla, eom intact  ENMT:moist mucous membranes, pharynx clear  NECK: supple. Thyroid normal  RESPIRATORY: Chest: clear to ascultation and percussion   CARDIOVASCULAR: Heart: regular rate and rhythm  GASTROINTESTINAL: Abdomen: soft, bowel sounds active  HEMATOLOGIC: no pathological lymph nodes palpated  MUSCULOSKELETAL: Extremities: no edema, pulse 1+   INTEGUMENT: No unusual rashes or suspicious skin lesions noted. Nails appear normal.  NEUROLOGIC: non-focal exam   MENTAL STATUS: alert and oriented, appropriate affect     ASSESSMENT:   1. Preoperative examination    2. Type 2 diabetes mellitus without complication, without long-term current use of insulin ([address]    3. Essential hypertension    4. Dyslipidemia    5. Obesity (BMI 30.0-34.9)    6. Primary osteoarthritis of knee, unspecified laterality    7. Occult blood in stools      Diagnoses and all orders for this visit:    1. Preoperative examination  -     AMB POC EKG ROUTINE W/ 12 LEADS, INTER & REP  -     XR CHEST PA LAT; Future    2. Type 2 diabetes mellitus without complication, without long-term current use of insulin ([facility])  Assessment & Plan:  Stable, based on history, physical exam and review of pertinent labs, studies and medications; meds reconciled; lifestyle modifications recommended, medication compliance emphasized. Key Antihyperglycemic Medications             metFORMIN ER (GLUCOPHAGE XR) 500 mg tablet  (Taking) TAKE 2 TABLETS BY MOUTH TWICE DAILY    sitagliptin (JANUVIA) 100 mg tablet  (Taking) Take 100 mg by mouth daily.         Other Key Diabetic Medications             gabapentin (NEURONTIN) 100 mg capsule  (Taking) TAKE ONE CAPSULE BY MOUTH THREE TIMES DAILY    pravastatin (PRAVACHOL) 80 mg tablet  (Taking) TAKE 1 TABLET BY MOUTH EVERY NIGHT AT BEDTIME    lisinopril-hydroCHLOROthiazide (PRINZIDE, ZESTORETIC) 20-12.5 mg per tablet  (Taking) TAKE 1 TABLET BY MOUTH DAILY FOR 90 DAYS        Lab Results   Component Value Date/Time    Hemoglobin A1c 6.2 12/08/2017 02:38 PM    Glucose 81 12/08/2017 02:38 PM    Creatinine 0.77 12/08/2017 02:38 PM    Microalb/Creat ratio (ug/mg creat.) 5.2 05/15/2017 03:58 PM    Cholesterol, total 160 05/15/2017 03:58 PM    HDL Cholesterol 59 05/15/2017 03:58 PM    LDL, calculated 73 05/15/2017 03:58 PM    Triglyceride 140 05/15/2017 03:58 PM     Diabetic Foot and Eye Exam HM Status   Topic Date Due    Diabetic Foot Care  08/28/2018       Orders:  -     HEMOGLOBIN A1C WITH EAG  -     MICROALBUMIN, UR, RAND W/ MICROALB/CREAT RATIO  -     AMB POC EKG ROUTINE W/ 12 LEADS, INTER & REP    3. Essential hypertension  -     CBC WITH AUTOMATED DIFF  -     COLLECTION VENOUS BLOOD,VENIPUNCTURE  -     URINALYSIS W/ RFLX MICROSCOPIC  -     METABOLIC PANEL, COMPREHENSIVE  -     AMB POC EKG ROUTINE W/ 12 LEADS, INTER & REP  -     XR CHEST PA LAT; Future    4. Dyslipidemia  -     APOLIPOPROTEIN B  -     TSH 3RD GENERATION  -     LIPID PANEL    5. Obesity (BMI 30.0-34.9)    6. Primary osteoarthritis of knee, unspecified laterality    7. Occult blood in stools  -     OCCULT BLOOD, IMMUNOASSAY (FIT)        PLAN:    1. The patient is medically stable for planned procedure pending review of her lab work. 2. Her diabetes historically has been doing well and hopefully will continue. I will await the results of her hemoglobin A1C.    3. Her blood pressure is excellent and no adjustments were made. 4. She will continue her statin as prescribed in view of her primary cardiovascular risk prevention. 5. I encourage her to lose weight. This will impact her metabolic diseases, as well as her osteoarthritis. This can be accomplished by eating meals, avoiding snacks, and minimizing her consumption of processed carbohydrates. 6. Her osteoarthritis is reasonably stable for now, which is great.   She does not really need anything, other than to continue to walk on a regular basis.        .  Orders Placed This Encounter    XR CHEST PA LAT    OCCULT BLOOD, IMMUNOASSAY (FIT)    APOLIPOPROTEIN B    CBC WITH AUTOMATED DIFF    URINALYSIS W/ RFLX MICROSCOPIC    TSH 3RD GENERATION    METABOLIC PANEL, COMPREHENSIVE    LIPID PANEL    HEMOGLOBIN A1C WITH EAG    MICROALBUMIN, UR, RAND W/ MICROALB/CREAT RATIO    AMB POC EKG ROUTINE W/ 12 LEADS, INTER & REP         ATTENTION:   This medical record was transcribed using an electronic medical records system. Although proofread, it may and can contain electronic and spelling errors. Other human spelling and other errors may be present. Corrections may be executed at a later time. Please feel free to contact us for any clarifications as needed. Follow-up Disposition:  Return in about 3 months (around 6/23/2018).       Estee Avalos MD

## 2018-03-27 RX ORDER — ACETAMINOPHEN 500 MG
500 TABLET ORAL
COMMUNITY

## 2018-03-27 NOTE — PERIOP NOTES
MARIA INES SARGENT. PATIENT MADE AWARE OF PREOP INSTRUCTIONS. PATIENT VERBALIZED UNDERSTANDING. PATIENT HAS REPORTED GLAUCOMA BOTH EYES SO VISION IS COMPROMISED SOME.

## 2018-03-29 ENCOUNTER — ANESTHESIA EVENT (OUTPATIENT)
Dept: MEDSURG UNIT | Age: 68
End: 2018-03-29
Payer: MEDICARE

## 2018-03-30 ENCOUNTER — ANESTHESIA (OUTPATIENT)
Dept: MEDSURG UNIT | Age: 68
End: 2018-03-30
Payer: MEDICARE

## 2018-03-30 ENCOUNTER — HOSPITAL ENCOUNTER (OUTPATIENT)
Age: 68
Setting detail: OUTPATIENT SURGERY
Discharge: HOME | End: 2018-03-30
Attending: PODIATRIST | Admitting: PODIATRIST
Payer: MEDICARE

## 2018-03-30 VITALS
HEIGHT: 63 IN | SYSTOLIC BLOOD PRESSURE: 136 MMHG | OXYGEN SATURATION: 98 % | BODY MASS INDEX: 27.3 KG/M2 | RESPIRATION RATE: 16 BRPM | TEMPERATURE: 97.4 F | WEIGHT: 154.1 LBS | HEART RATE: 59 BPM | DIASTOLIC BLOOD PRESSURE: 78 MMHG

## 2018-03-30 LAB
GLUCOSE BLD STRIP.AUTO-MCNC: 131 MG/DL (ref 65–100)
GLUCOSE BLD STRIP.AUTO-MCNC: 98 MG/DL (ref 65–100)
SERVICE CMNT-IMP: ABNORMAL
SERVICE CMNT-IMP: NORMAL

## 2018-03-30 PROCEDURE — 77030031139 HC SUT VCRL2 J&J -A: Performed by: PODIATRIST

## 2018-03-30 PROCEDURE — G8978 MOBILITY CURRENT STATUS: HCPCS

## 2018-03-30 PROCEDURE — 82962 GLUCOSE BLOOD TEST: CPT

## 2018-03-30 PROCEDURE — 77030002986 HC SUT PROL J&J -A: Performed by: PODIATRIST

## 2018-03-30 PROCEDURE — 76030000003 HC AMB SURG OR TIME 1.5 TO 2: Performed by: PODIATRIST

## 2018-03-30 PROCEDURE — 74011000250 HC RX REV CODE- 250: Performed by: PODIATRIST

## 2018-03-30 PROCEDURE — 74011250636 HC RX REV CODE- 250/636

## 2018-03-30 PROCEDURE — 76210000057 HC AMBSU PH II REC 1 TO 1.5 HR: Performed by: PODIATRIST

## 2018-03-30 PROCEDURE — 97161 PT EVAL LOW COMPLEX 20 MIN: CPT

## 2018-03-30 PROCEDURE — 77030018836 HC SOL IRR NACL ICUM -A: Performed by: PODIATRIST

## 2018-03-30 PROCEDURE — 74011250636 HC RX REV CODE- 250/636: Performed by: PODIATRIST

## 2018-03-30 PROCEDURE — 74011250636 HC RX REV CODE- 250/636: Performed by: ANESTHESIOLOGY

## 2018-03-30 PROCEDURE — G8979 MOBILITY GOAL STATUS: HCPCS

## 2018-03-30 PROCEDURE — 77030002933 HC SUT MCRYL J&J -A: Performed by: PODIATRIST

## 2018-03-30 PROCEDURE — 77030020782 HC GWN BAIR PAWS FLX 3M -B

## 2018-03-30 PROCEDURE — 77030036687 HC SHOE PSTOP S2SG -A

## 2018-03-30 PROCEDURE — G8980 MOBILITY D/C STATUS: HCPCS

## 2018-03-30 PROCEDURE — 77030006773 HC BLD SAW OSC BRSM -A: Performed by: PODIATRIST

## 2018-03-30 PROCEDURE — 77030011640 HC PAD GRND REM COVD -A: Performed by: PODIATRIST

## 2018-03-30 PROCEDURE — 76060000063 HC AMB SURG ANES 1.5 TO 2 HR: Performed by: PODIATRIST

## 2018-03-30 PROCEDURE — 77030000032 HC CUF TRNQT ZIMM -B: Performed by: PODIATRIST

## 2018-03-30 RX ORDER — CEFAZOLIN SODIUM/WATER 2 G/20 ML
2 SYRINGE (ML) INTRAVENOUS
Status: COMPLETED | OUTPATIENT
Start: 2018-03-30 | End: 2018-03-30

## 2018-03-30 RX ORDER — MIDAZOLAM HYDROCHLORIDE 1 MG/ML
1 INJECTION, SOLUTION INTRAMUSCULAR; INTRAVENOUS
Status: DISCONTINUED | OUTPATIENT
Start: 2018-03-30 | End: 2018-03-30 | Stop reason: HOSPADM

## 2018-03-30 RX ORDER — SODIUM CHLORIDE 0.9 % (FLUSH) 0.9 %
5-10 SYRINGE (ML) INJECTION EVERY 8 HOURS
Status: DISCONTINUED | OUTPATIENT
Start: 2018-03-30 | End: 2018-03-30 | Stop reason: HOSPADM

## 2018-03-30 RX ORDER — MORPHINE SULFATE 10 MG/ML
2 INJECTION, SOLUTION INTRAMUSCULAR; INTRAVENOUS
Status: DISCONTINUED | OUTPATIENT
Start: 2018-03-30 | End: 2018-03-30 | Stop reason: HOSPADM

## 2018-03-30 RX ORDER — ONDANSETRON 2 MG/ML
4 INJECTION INTRAMUSCULAR; INTRAVENOUS AS NEEDED
Status: DISCONTINUED | OUTPATIENT
Start: 2018-03-30 | End: 2018-03-30 | Stop reason: HOSPADM

## 2018-03-30 RX ORDER — LIDOCAINE HYDROCHLORIDE 10 MG/ML
0.5 INJECTION, SOLUTION EPIDURAL; INFILTRATION; INTRACAUDAL; PERINEURAL AS NEEDED
Status: DISCONTINUED | OUTPATIENT
Start: 2018-03-30 | End: 2018-03-30 | Stop reason: HOSPADM

## 2018-03-30 RX ORDER — SODIUM CHLORIDE, SODIUM LACTATE, POTASSIUM CHLORIDE, CALCIUM CHLORIDE 600; 310; 30; 20 MG/100ML; MG/100ML; MG/100ML; MG/100ML
125 INJECTION, SOLUTION INTRAVENOUS CONTINUOUS
Status: DISCONTINUED | OUTPATIENT
Start: 2018-03-30 | End: 2018-03-30 | Stop reason: HOSPADM

## 2018-03-30 RX ORDER — MIDAZOLAM HYDROCHLORIDE 1 MG/ML
1 INJECTION, SOLUTION INTRAMUSCULAR; INTRAVENOUS AS NEEDED
Status: DISCONTINUED | OUTPATIENT
Start: 2018-03-30 | End: 2018-03-30 | Stop reason: HOSPADM

## 2018-03-30 RX ORDER — PROPOFOL 10 MG/ML
INJECTION, EMULSION INTRAVENOUS
Status: DISCONTINUED | OUTPATIENT
Start: 2018-03-30 | End: 2018-03-30 | Stop reason: HOSPADM

## 2018-03-30 RX ORDER — ACETAMINOPHEN 10 MG/ML
INJECTION, SOLUTION INTRAVENOUS AS NEEDED
Status: DISCONTINUED | OUTPATIENT
Start: 2018-03-30 | End: 2018-03-30 | Stop reason: HOSPADM

## 2018-03-30 RX ORDER — ONDANSETRON 2 MG/ML
INJECTION INTRAMUSCULAR; INTRAVENOUS AS NEEDED
Status: DISCONTINUED | OUTPATIENT
Start: 2018-03-30 | End: 2018-03-30 | Stop reason: HOSPADM

## 2018-03-30 RX ORDER — FENTANYL CITRATE 50 UG/ML
25 INJECTION, SOLUTION INTRAMUSCULAR; INTRAVENOUS
Status: DISCONTINUED | OUTPATIENT
Start: 2018-03-30 | End: 2018-03-30 | Stop reason: HOSPADM

## 2018-03-30 RX ORDER — SODIUM CHLORIDE 0.9 % (FLUSH) 0.9 %
5-10 SYRINGE (ML) INJECTION AS NEEDED
Status: DISCONTINUED | OUTPATIENT
Start: 2018-03-30 | End: 2018-03-30 | Stop reason: HOSPADM

## 2018-03-30 RX ORDER — CEFAZOLIN SODIUM 2 G/50ML
2 SOLUTION INTRAVENOUS ONCE
Status: DISCONTINUED | OUTPATIENT
Start: 2018-03-30 | End: 2018-03-30 | Stop reason: SDUPTHER

## 2018-03-30 RX ORDER — LIDOCAINE HYDROCHLORIDE 10 MG/ML
INJECTION INFILTRATION; PERINEURAL AS NEEDED
Status: DISCONTINUED | OUTPATIENT
Start: 2018-03-30 | End: 2018-03-30 | Stop reason: HOSPADM

## 2018-03-30 RX ORDER — DIPHENHYDRAMINE HYDROCHLORIDE 50 MG/ML
12.5 INJECTION, SOLUTION INTRAMUSCULAR; INTRAVENOUS AS NEEDED
Status: DISCONTINUED | OUTPATIENT
Start: 2018-03-30 | End: 2018-03-30 | Stop reason: HOSPADM

## 2018-03-30 RX ORDER — TRAMADOL HYDROCHLORIDE 50 MG/1
50 TABLET ORAL
Status: DISCONTINUED | OUTPATIENT
Start: 2018-03-30 | End: 2018-03-30 | Stop reason: HOSPADM

## 2018-03-30 RX ORDER — MIDAZOLAM HYDROCHLORIDE 1 MG/ML
INJECTION, SOLUTION INTRAMUSCULAR; INTRAVENOUS AS NEEDED
Status: DISCONTINUED | OUTPATIENT
Start: 2018-03-30 | End: 2018-03-30 | Stop reason: HOSPADM

## 2018-03-30 RX ORDER — FENTANYL CITRATE 50 UG/ML
50 INJECTION, SOLUTION INTRAMUSCULAR; INTRAVENOUS AS NEEDED
Status: DISCONTINUED | OUTPATIENT
Start: 2018-03-30 | End: 2018-03-30 | Stop reason: HOSPADM

## 2018-03-30 RX ORDER — BUPIVACAINE HYDROCHLORIDE 2.5 MG/ML
INJECTION, SOLUTION EPIDURAL; INFILTRATION; INTRACAUDAL AS NEEDED
Status: DISCONTINUED | OUTPATIENT
Start: 2018-03-30 | End: 2018-03-30 | Stop reason: HOSPADM

## 2018-03-30 RX ORDER — DEXTROSE, SODIUM CHLORIDE, SODIUM LACTATE, POTASSIUM CHLORIDE, AND CALCIUM CHLORIDE 5; .6; .31; .03; .02 G/100ML; G/100ML; G/100ML; G/100ML; G/100ML
125 INJECTION, SOLUTION INTRAVENOUS CONTINUOUS
Status: DISCONTINUED | OUTPATIENT
Start: 2018-03-30 | End: 2018-03-30 | Stop reason: HOSPADM

## 2018-03-30 RX ORDER — FENTANYL CITRATE 50 UG/ML
INJECTION, SOLUTION INTRAMUSCULAR; INTRAVENOUS AS NEEDED
Status: DISCONTINUED | OUTPATIENT
Start: 2018-03-30 | End: 2018-03-30 | Stop reason: HOSPADM

## 2018-03-30 RX ADMIN — Medication 2 G: at 07:49

## 2018-03-30 RX ADMIN — PROPOFOL 50 MCG/KG/MIN: 10 INJECTION, EMULSION INTRAVENOUS at 07:44

## 2018-03-30 RX ADMIN — FENTANYL CITRATE 25 MCG: 50 INJECTION, SOLUTION INTRAMUSCULAR; INTRAVENOUS at 08:19

## 2018-03-30 RX ADMIN — MIDAZOLAM HYDROCHLORIDE 3 MG: 1 INJECTION, SOLUTION INTRAMUSCULAR; INTRAVENOUS at 07:37

## 2018-03-30 RX ADMIN — FENTANYL CITRATE 25 MCG: 50 INJECTION, SOLUTION INTRAMUSCULAR; INTRAVENOUS at 07:56

## 2018-03-30 RX ADMIN — FENTANYL CITRATE 25 MCG: 50 INJECTION, SOLUTION INTRAMUSCULAR; INTRAVENOUS at 07:54

## 2018-03-30 RX ADMIN — ONDANSETRON 4 MG: 2 INJECTION INTRAMUSCULAR; INTRAVENOUS at 07:59

## 2018-03-30 RX ADMIN — SODIUM CHLORIDE, POTASSIUM CHLORIDE, SODIUM LACTATE AND CALCIUM CHLORIDE: 600; 310; 30; 20 INJECTION, SOLUTION INTRAVENOUS at 07:23

## 2018-03-30 RX ADMIN — ACETAMINOPHEN 1000 MG: 10 INJECTION, SOLUTION INTRAVENOUS at 07:36

## 2018-03-30 NOTE — BRIEF OP NOTE
BRIEF OPERATIVE NOTE    Date of Procedure: 3/30/2018   Preoperative Diagnosis: LEFT FOOT PAINFUL BUNION  Postoperative Diagnosis: LEFT FOOT PAINFUL BUNION    Procedure(s):  LEFT FOOT BUNIONECTOMY  (IV SED)  Surgeon(s) and Role:     * Gabriela Crawford DPM - Primary         Assistant Staff: None      Surgical Staff:  Circ-1: Sheena Canavan, RN  Scrub RN-1: Bree Mcclain RN  Event Time In   Incision Start 0177   Incision Close 0902     Anesthesia: MAC   Estimated Blood Loss: Minimal  Specimens: * No specimens in log *   Findings: Bone Prominence 1st metatarsal head.   Complications: None  Implants: * No implants in log *

## 2018-03-30 NOTE — IP AVS SNAPSHOT
[address] [facility] [address] [place] [address] 
[phone] Patient: [place] Beach MRN: [id number] WOF:[id number] About your hospitalization You were admitted on:  March 30, 2018 You last received care in the:  [facility] PACU You were discharged on:  March 30, 2018 Why you were hospitalized Your primary diagnosis was:  Not on File Follow-up Information Follow up With Details Comments Contact Info Jude Michel DPM In 1 week  [address]  [address] [facility] 
[phone] Tomas Casarez MD   [facility] [address] [place] [address] 
[phone] Discharge Orders None A check miguel ángel indicates which time of day the medication should be taken. My Medications ASK your doctor about these medications Instructions Each Dose to Equal  
 Morning Noon Evening Bedtime  
 albuterol 2.5 mg /3 mL (0.083 %) nebulizer solution Commonly known as:  PROVENTIL VENTOLIN Your last dose was: Your next dose is:    
   
   
 3 mL by Nebulization route every four (4) hours as needed for Wheezing. 2.5 mg  
    
   
   
   
  
 amLODIPine 10 mg tablet Commonly known as:  Gonzalo Medel What changed:  See the new instructions. Your last dose was: Your next dose is: TAKE 1 TABLET BY MOUTH EVERY DAY  
     
   
   
   
  
 dorzolamide 2 % ophthalmic solution Commonly known as:  TRUSOPT Your last dose was: Your next dose is:    
   
   
      
   
   
   
  
 gabapentin 100 mg capsule Commonly known as:  NEURONTIN Your last dose was: Your next dose is: TAKE ONE CAPSULE BY MOUTH THREE TIMES DAILY JANUVIA 100 mg tablet Generic drug:  SITagliptin Your last dose was: Your next dose is: Take 100 mg by mouth daily.   
 100 mg  
    
   
   
   
  
 lisinopril-hydroCHLOROthiazide 20-12.5 mg per tablet Commonly known as:  Viva Deal What changed:  See the new instructions. Your last dose was: Your next dose is: TAKE 1 TABLET BY MOUTH DAILY  
     
   
   
   
  
 metFORMIN  mg tablet Commonly known as:  GLUCOPHAGE XR Your last dose was: Your next dose is: TAKE 2 TABLETS BY MOUTH TWICE DAILY NexIUM 40 mg capsule Generic drug:  esomeprazole Your last dose was: Your next dose is:    
   
   
      
   
   
   
  
 potassium chloride 20 mEq tablet Commonly known as:  K-DUR, KLOR-CON Your last dose was: Your next dose is: TAKE 1 TABLET BY MOUTH DAILY pravastatin 80 mg tablet Commonly known as:  PRAVACHOL Your last dose was: Your next dose is: TAKE 1 TABLET BY MOUTH EVERY NIGHT AT BEDTIME  
     
   
   
   
  
 TRAVATAN Z 0.004 % ophthalmic solution Generic drug:  travoprost  
   
Your last dose was: Your next dose is:    
   
   
      
   
   
   
  
 TYLENOL EXTRA STRENGTH 500 mg tablet Generic drug:  acetaminophen Your last dose was: Your next dose is: Take 500 mg by mouth every six (6) hours as needed for Pain. 500 mg Where to Get Your Medications These medications were sent to [facility], [address] [place] [address] 99 [address] [place] [address] Phone:  [phone]  
  amLODIPine 10 mg tablet  
 lisinopril-hydroCHLOROthiazide 20-12.5 mg per tablet Discharge Instructions [facility] Podiatric Medicine - Foot Surgery [address] [place] [address]. [address] OFFICE [phone] CELL    [phone] You have just had surgery on your foot and/or ankle.   Proper care during the post-operative period is an integral part of your surgical treatment program.  It is imperative that these instructions are followed to insure proper healing and to obtain the best results. 1. GO directly home and keep your feet elevated on the way. 2. DRESSING OR CAST - Keep your dressing or cast clean and dry. DO NOT remove the bandage or inspect the wound. A small amount of blood on the bandage is normal.  If the dressing falls off or gets wet, call the office immediately. 3. ELEVATION - Place two pillows under the knee and leg. Make sure to support underneath your knees. You should elevate your leg whenever you are sitting or lying down. This includes mealtime and when retiring for the night. 4. ICE - Apply 1 or 2 small bags of ice close to, BUT NOT DIRECTLY OVER, the surgical site. The ice should be ON FOR TWENTY MINUTES, THEN OFF FOR ONE HALF HOUR. Continue this sequence throughout the day. Remember to keep the dressing or cast dry. Double-bagging the ice will help. 5. Limited pain and swelling is expected. 6. Exercise your legs frequently by bending your knees to stimulate circulation and speed healing. 7. You are to be:  
· PARTIAL WEIGHT BEARING - allowed to walk or stand with the aid of crutches or a walker to tolerance. 8. If you have a surgical shoe - it should be worn whenever you are standing or walking. 9. MEALS - Your first meal at home should be a light one such as toast or soup. If nausea and vomiting develop call the office immediately. Drink plenty of fluid and resume a well balanced diet. 10. Sponge baths are recommended until your first post-operative appointment. 11. PRESCRIPTIONS - Please fill and take as directed. If you have any difficulties or experience any side effects after taking this medication please discontinue and call the office and/or go to your closest Emergency Room immediately. Call our office to make your next appointment; Also, if you have any of the following: · Temperature above 100.5 degrees · Your bandage becomes overly stained, falls off or gets wet · You bump or injure the surgical site · Your medication does not stop discomfort WE WANT YOUR SURGERY AND RECOVERY TO BE SUCCESSFUL AND AS COMFORTABLE AS POSSIBLE. THANK YOU FOR FOLLOWING THESE INSTRUCTIONS. IF YOU HAVE ANY PROBLEMS OR QUESTIONS PLEASE CALL OUR OFFICE. DISCHARGE SUMMARY from Nurse PATIENT INSTRUCTIONS: 
 
 
F-face looks uneven A-arms unable to move or move unevenly S-speech slurred or non-existent T-time-call 911 as soon as signs and symptoms begin-DO NOT go Back to bed or wait to see if you get better-TIME IS BRAIN. Warning Signs of HEART ATTACK Call 911 if you have these symptoms: 
? Chest discomfort. Most heart attacks involve discomfort in the center of the chest that lasts more than a few minutes, or that goes away and comes back. It can feel like uncomfortable pressure, squeezing, fullness, or pain. ? Discomfort in other areas of the upper body. Symptoms can include pain or discomfort in one or both arms, the back, neck, jaw, or stomach. ? Shortness of breath with or without chest discomfort. ? Other signs may include breaking out in a cold sweat, nausea, or lightheadedness. Don't wait more than five minutes to call [address]! Fast action can save your life. Calling 911 is almost always the fastest way to get lifesaving treatment. Emergency Medical Services staff can begin treatment when they arrive  up to an hour sooner than if someone gets to the hospital by car.   
 
The discharge information has been reviewed with the patient and caregiver. The patient and caregiver verbalized understanding. Discharge medications reviewed with the patient and caregiver and appropriate educational materials and side effects teaching were provided. ___________________________________________________________________________________________________________________________________ ACO Transitions of Care Introducing [facility] Pat Lupillo offers a voluntary care coordination program to provide high quality service and care to [facility] fee-for-service beneficiaries. Brendan Mmeeranda was designed to help you enhance your health and well-being through the following services: ? Transitions of Care  support for individuals who are transitioning from one care setting to another (example: Hospital to home). ? Chronic and Complex Care Coordination  support for individuals and caregivers of those with serious or chronic illnesses or with more than one chronic (ongoing) condition and those who take a number of different medications. If you meet specific medical criteria, a [address] [place] [address] Rd may call you directly to coordinate your care with your primary care physician and your other care providers. For questions about the [facility] programs, please, contact your physicians office. For general questions or additional information about Accountable Care Organizations: 
Please visit www.medicare.gov/acos. html or call 1-800-MEDICARE (1-858.361.5332) TTY users should call 4-263.431.7185. Introducing [facility]! Iris Keene introduces Lightbox patient portal. Now you can access parts of your medical record, email your doctor's office, and request medication refills online. 1. In your internet browser, go to [email or web address] 2. Click on the First Time User? Click Here link in the Sign In box.  You will see the New Member Sign Up page. 3. Enter your [employer] Access Code exactly as it appears below. You will not need to use this code after youve completed the sign-up process. If you do not sign up before the expiration date, you must request a new code. · [employer] Access Code: [facility] Expires: 6/21/2018  2:35 PM 
 
4. Enter the last four digits of your Social Security Number (xxxx) and Date of Birth (mm/dd/yyyy) as indicated and click Submit. You will be taken to the next sign-up page. 5. Create a [employer] ID. This will be your [email or web address] login ID and cannot be changed, so think of one that is secure and easy to remember. 6. Create a [employer] password. You can change your password at any time. 7. Enter your Password Reset Question and Answer. This can be used at a later time if you forget your password. 8. Enter your e-mail address. You will receive e-mail notification when new information is available in [address] 9. Click Sign Up. You can now view and download portions of your medical record. 10. Click the Download Summary menu link to download a portable copy of your medical information. If you have questions, please visit the Frequently Asked Questions section of the [employer] website. Remember, [email or web address] is NOT to be used for urgent needs. For medical emergencies, dial 911. Now available from your iPhone and Android! Introducing Miguel Hwang As a [facility] patient, I wanted to make you aware of our electronic visit tool called Miguel Hwang. Stephen Parham 24/7 allows you to connect within minutes with a medical provider 24 hours a day, seven days a week via a mobile device or tablet or logging into a secure website from your computer. You can access Miguel Hwang from anywhere in the [place].  
 
A virtual visit might be right for you when you have a simple condition and feel like you just dont want to get out of bed, or cant get away from work for an appointment, when your regular [facility] provider is not available (evenings, weekends or holidays), or when youre out of town and need minor care. Electronic visits cost only $49 and if the [facility] [employer] [facility] 24/7 provider determines a prescription is needed to treat your condition, one can be electronically transmitted to a nearby pharmacy*. Please take a moment to enroll today if you have not already done so. The enrollment process is free and takes just a few minutes. To enroll, please download the [facility]/Sonendo eilo to your tablet or phone, or visit [email or web address] to enroll on your computer. And, as an [address] [place] [address] patient with a [employer] account, the results of your visits will be scanned into your electronic medical record and your primary care provider will be able to view the scanned results. We urge you to continue to see your regular [facility] provider for your ongoing medical care. And while your primary care provider may not be the one available when you seek a [employer] virtual visit, the peace of mind you get from getting a real diagnosis real time can be priceless. For more information on [employer], view our Frequently Asked Questions (FAQs) at [email or web address]. Sincerely, 
 
Eusebio Esquivel MD 
Chief Medical Officer [address] Pat Wesley *:  certain medications cannot be prescribed via [employer] Providers Seen During Your Hospitalization Provider Specialty Primary office phone Cedrick Polk, [address] [facility] [phone] Your Primary Care Physician (PCP) Primary Care Physician Office Phone Office Fax 104 Tina Dodson [facility] [phone] You are allergic to the following Allergen Reactions Percocet (Oxycodone-Acetaminophen) Rash Itching Recent Documentation Height Weight BMI OB Status Smoking Status 1.6 m 69.9 kg 27.3 kg/m2 Hysterectomy Never Smoker Emergency Contacts Name Discharge Info Relation Home Work Mobile Glenn Vivas CAREGIVER [3] Child [2] [phone] Patient Belongings The following personal items are in your possession at time of discharge: 
  Dental Appliances: None  Visual Aid: Glasses, With patient   Hearing Aids/Status: Does not own         Clothing:  (Clothes in locker) Please provide this summary of care documentation to your next provider. Signatures-by signing, you are acknowledging that this After Visit Summary has been reviewed with you and you have received a copy. Patient Signature:  ____________________________________________________________ Date:  ____________________________________________________________  
  
[facility] Signature:  ____________________________________________________________ Date:  ____________________________________________________________

## 2018-03-30 NOTE — ANESTHESIA POSTPROCEDURE EVALUATION
Post-Anesthesia Evaluation and Assessment    Patient: Leon Fabian MRN: [id number]  SSN: [id number]    YOB: 1950  Age: 75 y.o. Sex: female       Cardiovascular Function/Vital Signs  Visit Vitals    /85    Pulse 62    Temp 36.3 °C (97.4 °F)    Resp 16    Ht 5' 3\" (1.6 m)    Wt 69.9 kg (154 lb 1.6 oz)    SpO2 100%    BMI 27.3 kg/m2       Patient is status post MAC anesthesia for Procedure(s):  LEFT FOOT BUNIONECTOMY  (IV SED). Nausea/Vomiting: None    Postoperative hydration reviewed and adequate. Pain:  Pain Scale 1: Numeric (0 - 10) (03/30/18 0920)  Pain Intensity 1: 0 (03/30/18 0920)   Managed    Neurological Status:   Neuro (WDL): Within Defined Limits (03/30/18 0920)   At baseline    Mental Status and Level of Consciousness: Arousable    Pulmonary Status:   O2 Device: Room air (03/30/18 0920)   Adequate oxygenation and airway patent    Complications related to anesthesia: None    Post-anesthesia assessment completed.  No concerns    Signed By: Prudencio Clemente MD     March 30, 2018

## 2018-03-30 NOTE — ANESTHESIA PREPROCEDURE EVALUATION
Anesthetic History   No history of anesthetic complications            Review of Systems / Medical History  Patient summary reviewed, nursing notes reviewed and pertinent labs reviewed    Pulmonary  Within defined limits          Asthma        Neuro/Psych   Within defined limits           Cardiovascular  Within defined limits                     GI/Hepatic/Renal  Within defined limits              Endo/Other  Within defined limits  Diabetes    Arthritis     Other Findings              Physical Exam    Airway  Mallampati: II  TM Distance: > 6 cm  Neck ROM: normal range of motion   Mouth opening: Normal     Cardiovascular  Regular rate and rhythm,  S1 and S2 normal,  no murmur, click, rub, or gallop             Dental  No notable dental hx       Pulmonary  Breath sounds clear to auscultation               Abdominal  GI exam deferred       Other Findings            Anesthetic Plan    ASA: 2  Anesthesia type: MAC          Induction: Intravenous  Anesthetic plan and risks discussed with: Patient

## 2018-03-30 NOTE — H&P
Podiatry History and Physical    Subjective:         Date of Consultation:  March 30, 2018    Referring Physician: None    Patient is a 75 y.o. female who is being seen for Left foot bunion. Workup has revealed pwin of left HAV with ambulation and weightberaing. Patient Active Problem List    Diagnosis Date Noted    Obesity (BMI 30.0-34.9) 03/24/2018    Right bundle branch block 03/24/2018    Acute midline low back pain without sciatica 08/03/2016    Reactive airway disease 11/20/2014    Rhinitis 11/20/2014    Hypertension 10/08/2014    Diabetes mellitus ([address] 10/08/2014    Dyslipidemia 10/08/2014    Osteoarthritis 10/08/2014     Past Medical History:   Diagnosis Date    Asthma     [facility] [address] [address]     Glaucoma     Hypercholesterolemia     Hypertension       Family History   Problem Relation Age of Onset    Cancer Mother     Lung Disease Father     Cancer Brother     Anesth Problems Neg Hx       Social History   Substance Use Topics    Smoking status: Never Smoker    Smokeless tobacco: Never Used    Alcohol use 0.5 oz/week     1 Cans of beer per week     Past Surgical History:   Procedure Laterality Date    HX COLONOSCOPY      HX GYN      status post hysterectomy,BSO,and C-sections x3    HX ORTHOPAEDIC      Arthroscopic surgery for both knees      Prior to Admission medications    Medication Sig Start Date End Date Taking? Authorizing Provider   acetaminophen (TYLENOL EXTRA STRENGTH) 500 mg tablet Take 500 mg by mouth every six (6) hours as needed for Pain.    Yes Historical Provider   gabapentin (NEURONTIN) 100 mg capsule TAKE ONE CAPSULE BY MOUTH THREE TIMES DAILY 3/9/18  Yes Jeremy Mack MD   metFORMIN ER (GLUCOPHAGE XR) 500 mg tablet TAKE 2 TABLETS BY MOUTH TWICE DAILY 1/15/18  Yes Jeremy Mack MD   pravastatin (PRAVACHOL) 80 mg tablet TAKE 1 TABLET BY MOUTH EVERY NIGHT AT BEDTIME 10/18/17  Yes Jeremy Mack MD   potassium chloride (K-DUR, KLOR-CON) 20 mEq tablet TAKE 1 TABLET BY MOUTH DAILY 17  Yes Carmen Hines MD   lisinopril-hydroCHLOROthiazide (PRINZIDE, ZESTORETIC) 20-12.5 mg per tablet TAKE 1 TABLET BY MOUTH DAILY FOR 90 DAYS 17  Yes Regino Joya MD   amLODIPine (NORVASC) 10 mg tablet TAKE 1 TABLET BY MOUTH ONCE A DAY FOR 90 DAYS 17  Yes Regino Joya MD   dorzolamide (TRUSOPT) 2 % ophthalmic solution  4/12/15  Yes Historical Provider   NEXIUM 40 mg capsule  2/25/15  Yes Historical Provider   TRAVATAN Z 0.004 % ophthalmic solution  4/6/15  Yes Historical Provider   sitagliptin (JANUVIA) 100 mg tablet Take 100 mg by mouth daily. Yes Historical Provider   albuterol (PROVENTIL VENTOLIN) 2.5 mg /3 mL (0.083 %) nebulizer solution 3 mL by Nebulization route every four (4) hours as needed for Wheezing. 17   Regino Joya MD     Allergies   Allergen Reactions    Percocet [Oxycodone-Acetaminophen] Rash and Itching        Review of Systems:  A comprehensive review of systems was negative except for that written in the HPI. Objective:     Patient Vitals for the past 8 hrs:   BP Temp Pulse Resp SpO2 Height Weight   18 0728 (!) 131/93 98.2 °F (36.8 °C) 79 16 99 % 5' 3\" (1.6 m) 69.9 kg (154 lb 1.6 oz)     Temp (24hrs), Av.2 °F (36.8 °C), Min:98.2 °F (36.8 °C), Max:98.2 °F (36.8 °C)    Neurovascular status is intact. No open wounds. Left foot painful HAV with palpation and ROM. Data Review:   Recent Results (from the past 24 hour(s))   GLUCOSE, POC    Collection Time: 18  7:23 AM   Result Value Ref Range    Glucose (POC) 131 (H) 65 - 100 mg/dL    Performed by Nikkie Ryan          Impression:     Left foot HAV. Recommendation:     Patient is NPO since midnight. Consent is signed and in chart. All risks, complications and benefits explained. Procedure discussed in detail. No guarantees made to the outcome of the procedure. To OR for left foot bunionectomy.

## 2018-03-30 NOTE — OP NOTES
[facility] OP NOTE    Lora Bourgeois  MR#: [id number]  : 1950  ACCOUNT #: [de-identified]   DATE OF SERVICE: 2018    SURGEON:  Chinmay Guardado DPM.    ASSISTANT:  Documented in brief op note. PREOPERATIVE DIAGNOSIS:  Left foot hallux valgus. POSTOPERATIVE DIAGNOSIS:  Left foot hallux valgus. PROCEDURE:  Left foot Silver bunionectomy. PATHOLOGY SENT:  None. ANESTHESIA USED:  MAC with 8 mL of 1:1 mixture of 1% lidocaine plain and 0.25% Marcaine plain. HEMOSTASIS:  Achieved with a pneumatic ankle tourniquet at 250 mmHg for 50 minutes. MATERIALS USED:  2-0 and 3-0 Vicryl, 4-0 Prolene, 4-0 Monocryl. INJECTABLES:  3 mL of 1% lidocaine followed by 8 mL of 0.25% Marcaine. COMPLICATIONS:  None. CONDITION:  Stable . ESTIMATED BLOOD LOSS:  Minimal     SPECIMENS REMOVED:  None     IMPLANTS:  None     INDICATION FOR PROCEDURE:  The patient is a 69-year-old female presented to preoperative holding area for painful left first metatarsal head. She states that the area is prominent and rubs in shoes. The pain is worse with ambulation. She has tried conservative treatment, which included changing shoes without improvement. She would likely require surgical intervention to improve her pain. Upon review of the x-ray, there is a prominent first metatarsal head medially and dorsally. PROCEDURE IN DETAIL:  The patient was identified in the preoperative holding area and cleared by nursing and anesthesia. Consent was signed. All risks, complications, and benefits of the procedure were explained. The patient understands the procedure and elects to continue with the procedure. Patient was then taken back to the operating room and sedated. Attention was directed to the left lower extremity where an ankle tourniquet was placed on the left ankle.   Left foot was injected with 8 mL of 1:1 mixture of 1% lidocaine plain and 0.25% Marcaine plain.  The left foot was sterilely prepped and draped. All surgical staff was sterilely scrubbed and gowned. Timeout was performed. An Esmarch was used to exsanguinate the foot and tourniquet was inflated. A skin marker was used to draw incision line over the first metatarsophalangeal joint  and a 15 blade was used to make the incision. Blunt dissection was achieved with Metzenbaum. All vital structures including neurovascular structures and tendons were retracted. A new 15 blade was used to incise through the periosteum and capsule of the first metatarsophalangeal joint. Adequate exposure to the first metatarsophalangeal joint was achieved with dissection of the periosteum. Upon inspection of the first metatarsal head, there is a prominent area medially and dorsally. There is some cartilage damage from arthritis on the dorsal aspect of the first metatarsal head. At this time, decision was made to resect the medial and dorsal prominences of the first metatarsal head using a sagittal saw. The area was then flushed and upon evaluation of the range of motion of the first metatarsophalangeal joint, there was increased range of motion. The prominent medial eminence was also reduced. At this time, decision was made to flush and close. The deep tissues were closed with 2-0 and 3-0 Vicryl. Skin was closed with 4-0 Prolene and 4-0 Monocryl. Steri-Strips were applied along with Betadine-soaked Adaptic, dry sterile dressing, and Coban for compression. Patient tolerated the procedure well and was transferred to the PACU with stable vitals and her neurovascular status intact. She was given appropriate postoperative instructions to remain partial weightbearing to the left  foot. She is to follow up with Dr. Dava Homans in one week. She was given pain medication, which included tramadol and ibuprofen for postoperatively. She was given physical therapy with a rolling walker.       RAISSA AMAYA LANDON CHAU / DENNIS  D: 03/30/2018 09:55     T: 03/30/2018 16:17  JOB #: [id number]

## 2018-03-30 NOTE — DISCHARGE INSTRUCTIONS
[facility] [id number]  [address] [facility]  OFFICE [phone]  CELL    [phone]    You have just had surgery on your foot and/or ankle. Proper care during the post-operative period is an integral part of your surgical treatment program.  It is imperative that these instructions are followed to insure proper healing and to obtain the best results. 1. GO directly home and keep your feet elevated on the way. 2. DRESSING OR CAST - Keep your dressing or cast clean and dry. DO NOT remove the bandage or inspect the wound. A small amount of blood on the bandage is normal.  If the dressing falls off or gets wet, call the office immediately. 3. ELEVATION - Place two pillows under the knee and leg. Make sure to support underneath your knees. You should elevate your leg whenever you are sitting or lying down. This includes mealtime and when retiring for the night. 4. ICE - Apply 1 or 2 small bags of ice close to, BUT NOT DIRECTLY OVER, the surgical site. The ice should be ON FOR TWENTY MINUTES, THEN OFF FOR ONE HALF HOUR. Continue this sequence throughout the day. Remember to keep the dressing or cast dry. Double-bagging the ice will help. 5. Limited pain and swelling is expected. 6. Exercise your legs frequently by bending your knees to stimulate circulation and speed healing. 7. You are to be:   · PARTIAL WEIGHT BEARING - allowed to walk or stand with the aid of crutches or a walker to tolerance. 8. If you have a surgical shoe - it should be worn whenever you are standing or walking. 9. MEALS - Your first meal at home should be a light one such as toast or soup. If nausea and vomiting develop call the office immediately. Drink plenty of fluid and resume a well balanced diet. 10. Sponge baths are recommended until your first post-operative appointment. 11. PRESCRIPTIONS - Please fill and take as directed.   If you have any difficulties or experience any side effects after taking this medication please discontinue and call the office and/or go to your closest Emergency Room immediately. Call our office to make your next appointment; Also, if you have any of the following:  · Temperature above 100.5 degrees  · Your bandage becomes overly stained, falls off or gets wet  · You bump or injure the surgical site  · Your medication does not stop discomfort    WE WANT YOUR SURGERY AND RECOVERY TO BE SUCCESSFUL AND AS COMFORTABLE AS POSSIBLE. THANK YOU FOR FOLLOWING THESE INSTRUCTIONS. IF YOU HAVE ANY PROBLEMS OR QUESTIONS PLEASE CALL OUR OFFICE. DISCHARGE SUMMARY from Nurse    PATIENT INSTRUCTIONS:    After general anesthesia or intravenous sedation, for 24 hours or while taking prescription Narcotics:  · Limit your activities  · Do not drive and operate hazardous machinery  · Do not make important personal or business decisions  · Do  not drink alcoholic beverages  · If you have not urinated within 8 hours after discharge, please contact your surgeon on call. Report the following to your surgeon:  · Excessive pain, swelling, redness or odor of or around the surgical area  · Temperature over 100.5  · Nausea and vomiting lasting longer than 4 hours or if unable to take medications  · Any signs of decreased circulation or nerve impairment to extremity: change in color, persistent  numbness, tingling, coldness or increase pain  · Any questions    What to do at Home:  Recommended activity: See surgical instructions. If you experience any of the following symptoms as noted above, please follow up with Dr. Frieda Rai. *  Please give a list of your current medications to your Primary Care Provider. *  Please update this list whenever your medications are discontinued, doses are      changed, or new medications (including over-the-counter products) are added.     *  Please carry medication information at all times in case of emergency situations. These are general instructions for a healthy lifestyle:    No smoking/ No tobacco products/ Avoid exposure to second hand smoke  Surgeon General's Warning:  Quitting smoking now greatly reduces serious risk to your health. Obesity, smoking, and sedentary lifestyle greatly increases your risk for illness    A healthy diet, regular physical exercise & weight monitoring are important for maintaining a healthy lifestyle    You may be retaining fluid if you have a history of heart failure or if you experience any of the following symptoms:  Weight gain of 3 pounds or more overnight or 5 pounds in a week, increased swelling in our hands or feet or shortness of breath while lying flat in bed. Please call your doctor as soon as you notice any of these symptoms; do not wait until your next office visit. Recognize signs and symptoms of STROKE:    F-face looks uneven    A-arms unable to move or move unevenly    S-speech slurred or non-existent    T-time-call 911 as soon as signs and symptoms begin-DO NOT go       Back to bed or wait to see if you get better-TIME IS BRAIN. Warning Signs of HEART ATTACK     Call 911 if you have these symptoms:   Chest discomfort. Most heart attacks involve discomfort in the center of the chest that lasts more than a few minutes, or that goes away and comes back. It can feel like uncomfortable pressure, squeezing, fullness, or pain.  Discomfort in other areas of the upper body. Symptoms can include pain or discomfort in one or both arms, the back, neck, jaw, or stomach.  Shortness of breath with or without chest discomfort.  Other signs may include breaking out in a cold sweat, nausea, or lightheadedness. Don't wait more than five minutes to call 911 - MINUTES MATTER! Fast action can save your life. Calling 911 is almost always the fastest way to get lifesaving treatment.  Emergency Medical Services staff can begin treatment when they arrive -- up to an hour sooner than if someone gets to the hospital by car. The discharge information has been reviewed with the patient and caregiver. The patient and caregiver verbalized understanding. Discharge medications reviewed with the patient and caregiver and appropriate educational materials and side effects teaching were provided.   ___________________________________________________________________________________________________________________________________

## 2018-03-30 NOTE — PROGRESS NOTES
physical Therapy EVALUATION with CMS G codes  (Ambulatory surgery, emergency room & recovery room patients)    Patient: Chon Feliz (72 y.o. female)  Date: 3/30/2018  Primary Diagnosis and Medical History: LEFT FOOT PAINFUL BUNION  Procedure(s) (LRB):  LEFT FOOT BUNIONECTOMY  (IV SED) (Left) Day of Surgery   Past Medical History:   Diagnosis Date    Asthma     Diabetes ([address]     Glaucoma     Hypercholesterolemia     Hypertension      Past Surgical History:   Procedure Laterality Date    HX COLONOSCOPY      HX GYN      status post hysterectomy,BSO,and C-sections x3    HX ORTHOPAEDIC      Arthroscopic surgery for both knees     Patient Active Problem List   Diagnosis Code    Hypertension I10    Diabetes mellitus ([address] E11.9    Dyslipidemia E78.5    Osteoarthritis M19.90    Reactive airway disease J45.909    Rhinitis J31.0    Acute midline low back pain without sciatica M54.5    Obesity (BMI 30.0-34. 9) E66.9    Right bundle branch block I45.10     Prior Level of Function/Home Situation: independent  Home Situation  Home Environment: Private residence  # Steps to Enter: 4  One/Two Story Residence: Two story  # of Interior Steps: 14  Living Alone: No  Support Systems: Family member(s)  Patient Expects to be Discharged to[de-identified] Private residence  Current DME Used/Available at Home: None  Ordered Weight Bearing Status:  left partial  Equipment: walker rolling  Critical Behavior:   alert; age appropriate           Transfers:  Overall level of assistance required following instruction: modified independence given verbal using rolling walker. Ambulation:  Weight bearing status during ambulation:         patient with post op shoe and to be partial weightbearing  Able to ambulate with rolling walker with good balance and management of walker           Stair Management:      reviewed technique. Patient states she goes up/down on buttock and daughter assist up the one step into home.      Strength/ROM Limitations:  WFL  Pain:  Pain Scale 1: Numeric (0 - 10)  Pain Intensity 1: 0       Education:  Role of P.T. explained to the patient:  [x]  Yes              []   No    In compliance with CMSs Claims Based Outcome Reporting, the following G-code set was chosen for this patient:    The primary function of this evaluation was education of the patient on how to utilize an assistive device while safely maintaining ordered levels of weight bearing. This patient is post anesthesia impairing memory and coordination while limiting true assessment of functional ability. This intervention is not designed to treat a primary functional limitation therefore all 3 G codes are the same. ? Mobility - Walking and Moving Around:     - CURRENT STATUS: CI - 1%-19% impaired, limited or restricted    - GOAL STATUS:  CI - 1%-19% impaired, limited or restricted    - D/C STATUS:  CI - 1%-19% impaired, limited or restricted          Topics addressed: Comments:   [x]                                    Device use and technique    [x]                                    Transfer technique    [x]                                    Gait training    [x]                                    Stair training    Rolling walker provided prior to discharge. Patient is discharged from physical therapy at this time.     Amaris Youssef, PT   Time Calculation: 10 mins

## 2018-04-02 ENCOUNTER — PATIENT OUTREACH (OUTPATIENT)
Dept: INTERNAL MEDICINE CLINIC | Age: 68
End: 2018-04-02

## 2018-04-02 LAB — HEMOCCULT STL QL IA: NEGATIVE

## 2018-04-12 ENCOUNTER — HOSPITAL ENCOUNTER (OUTPATIENT)
Dept: GENERAL RADIOLOGY | Age: 68
Discharge: HOME | End: 2018-04-12
Attending: PODIATRIST
Payer: MEDICARE

## 2018-04-12 DIAGNOSIS — M21.612 BUNION OF LEFT FOOT: ICD-10-CM

## 2018-04-12 PROCEDURE — 73630 X-RAY EXAM OF FOOT: CPT

## 2018-07-02 PROBLEM — Z91.199 NO-SHOW FOR APPOINTMENT: Status: RESOLVED | Noted: 2018-07-02 | Resolved: 2018-07-02

## 2018-07-02 PROBLEM — Z91.199 NO-SHOW FOR APPOINTMENT: Status: ACTIVE | Noted: 2018-07-02

## 2018-07-11 ENCOUNTER — OFFICE VISIT (OUTPATIENT)
Dept: NEUROLOGY | Age: 68
End: 2018-07-11

## 2018-07-11 DIAGNOSIS — E11.40 NEUROPATHY DUE TO TYPE 2 DIABETES MELLITUS (HCC): Primary | ICD-10-CM

## 2018-07-11 NOTE — PROGRESS NOTES
This was an elective EMG and nerve conduction patient's lower extremities as requested by podiatrist.    Patient history and exam.  As I know it, she is under referral for concerns of diabetic neuropathy. Has had DM for several years on oral agent. Has distortions of sensibility in her lower legs and feet. Says she is on oral agent for her diabetes and blood sugars normally are in a range that is predictably in the 130s or perhaps lower. Says her glucose monitor currently is dysfunctional.    EMG and nerve conduction findings:    1. EMG involved sampling representative muscles of both lower extremities. Needle insertion and probing did not reveal any spontaneous activity nor acute denervation chronic denervation/reinnervation or myopathic potentials. Patient found some muscles more uncomfortable than others but did a great job and there was no compromise of exam quality. Motor unit recruitment as to number morphology and time sequencing was all normal.    2.  The nerve conduction portion was normal with the minor exception of the R H reflex of a slightly slower latency than the L. Significance, if any, unknown. Impression: This is an essentially normal EMG nerve conduction test.  The discrepancy of the H reflex latency is of uncertain, if any, practical clinical concern. This study does not rule out a small fiber sensory neuropathy. Clinical correlation is advised.   LISE MORAN.

## 2018-07-11 NOTE — PROGRESS NOTES
EMG/ NCS Report   [facility]  Nila Velazquez, [address]   Ph: [phone]   FAX: [phone]/ [phone]  Test Date:  2018    Patient: Tomas Leal : 1950 Physician: Darlene Choi MD   Sex: Female Height: ' \" Ref Phys: Puja Henao D.P.M   ID#: [id number] Weight:  lbs. Technician: Abner Thomas     Patient History / Exam:  CC:BLL,DIABETIC NEUROPATHY          EMG & NCV Findings:  Evaluation of the left Fibular motor and the right Fibular motor nerves showed normal distal onset latency (L3.0, R2.7 ms), normal amplitude (L3.2, R2.2 mV), normal conduction velocity (B Fib-Ankle, L50, R52 m/s), and normal conduction velocity (Poplt-B Fib, L71, R50 m/s). The left tibial motor and the right tibial motor nerves showed normal distal onset latency (L3.4, R4.3 ms), normal amplitude (L3.9, R7.1 mV), and normal conduction velocity (Knee-Ankle, L49, R53 m/s). The left Sup Fibular sensory and the right Sup Fibular sensory nerves showed normal distal peak latency (L2.5, R2.4 ms), normal amplitude (L10.7, R5.5 µV), and normal conduction velocity (Lower leg-Lat ankle, L50, R48 m/s). The left sural sensory and the right sural sensory nerves showed normal distal peak latency (L3.1, R3.1 ms) and normal amplitude (L9.8, R16.3 µV). All F Wave latencies were within normal limits. All F Wave left vs. right side latency differences were within normal limits. Left vs. Right comparison data for the tibial H-reflex indicates abnormal L-R latency difference (3.20 ms). All examined muscles (as indicated in the following table) showed no evidence of electrical instability.         Impression:        ___________________________  Angle Webster IV, MD      Nerve Conduction Studies  Anti Sensory Summary Table     Stim Site NR Peak (ms) Norm Peak (ms) P-T Amp (µV) Norm P-T Amp Site1 Site2 Dist (cm)   Left Sup Fibular Anti Sensory (Lat ankle)  33.3°C   Lower leg    2.5 <4.6 10.7 >4 Lower leg Lat ankle 10.0   Site 2    2.4  9.8       Site 3    2.4  12.8       Right Sup Fibular Anti Sensory (Lat ankle)  31.5°C   Lower leg    2.4 <4.6 5.5 >4 Lower leg Lat ankle 10.0   Site 2    2.3  15.1       Site 3    2.4  17.3       Left Sural Anti Sensory (Lat Mall)  39.5°C   Calf    3.1 <4.5 9.8 >4.0 Calf Lat Mall 14.0   Site 2    3.1  6.0       Site 3    3.3  4.8           3.0  9.7       Right Sural Anti Sensory (Lat Mall)  32°C   Calf    3.1 <4.5 16.3 >4.0 Calf Lat Mall 14.0   Site 2    3.2  14.0       Site 3    3.3  14.1         Motor Summary Table     Stim Site NR Onset (ms) Norm Onset (ms) O-P Amp (mV) Norm O-P Amp Amp (Prev) (%) Site1 Site2 Dist (cm) Maximiliano (m/s) Norm Maximiliano (m/s)   Left Fibular Motor (Ext Dig Brev)  32.7°C   Ankle    3.0 <6.5 3.2 >1.1 100.0 Ankle Ext Dig Brev 8.0     B Fib    9.0  2.7  84.4 B Fib Ankle 30.0 50 >38   Poplt    10.4  3.2  118.5 Poplt B Fib 10.0 71 >42   Right Fibular Motor (Ext Dig Brev)  31.3°C   Ankle    2.7 <6.5 2.2 >1.1 100.0 Ankle Ext Dig Brev 8.0     B Fib    8.9  1.9  86.4 B Fib Ankle 32.0 52 >38   Poplt    10.9  1.7  89.5 Poplt B Fib 10.0 50 >42   Left Tibial Motor (Abd St Brev)  32.2°C   Ankle    3.4 <6.1 3.9 >1.1 100.0 Ankle Abd St Brev 8.0     Knee    10.4  3.4  87.2 Knee Ankle 34.0 49 >39   Right Tibial Motor (Abd St Brev)  31.4°C   Ankle    4.3 <6.1 7.1 >1.1 100.0 Ankle Abd St Brev 8.0     Knee    11.1  4.2  59.2 Knee Ankle 36.0 53 >39     F Wave Studies     NR F-Lat (ms) Lat Norm (ms) L-R F-Lat (ms) L-R Lat Norm   Left Tibial (Mrkrs) (Abd Hallucis)  31.8°C      42.12 <56 5.37 <5.7   Right Tibial (Mrkrs) (Abd Hallucis)  31.1°C      47.50 <56 5.37 <5.7     H Reflex Studies     NR H-Lat (ms) L-R H-Lat (ms) L-R Lat Norm   Left Tibial (Gastroc)  31.7°C      28.53 3.20 <2.0   Right Tibial (Gastroc)  31.1°C      31.73 3.20 <2.0     EMG     Side Muscle Nerve Root Ins Act Fibs Psw Recrt Duration Amp Poly Comment Right Ext Dig Brev Dp Br Peron L5, S1 Nml Nml Nml Nml Nml Nml Nml    Right AntTibialis Dp Br Peron L4-5 Nml Nml Nml Nml Nml Nml Nml    Right MedGastroc Tibial S1-2 Nml Nml Nml Nml Nml Nml Nml    Right VastusMed Femoral L2-4 Nml Nml Nml Nml Nml Nml Nml    Right BicepsFemL Sciatic L5-S2 Nml Nml Nml Nml Nml Nml Nml    Left Ext Dig Brev Dp Br Peron L5, S1 Nml Nml Nml Nml Nml Nml Nml    Left AntTibialis Dp Br Peron L4-5 Nml Nml Nml Nml Nml Nml Nml    Left MedGastroc Tibial S1-2 Nml Nml Nml Nml Nml Nml Nml    Left VastusMed Femoral L2-4 Nml Nml Nml Nml Nml Nml Nml    Left BicepsFemL Sciatic L5-S2 Nml Nml Nml Nml Nml Nml Nml                Nerve Conduction Studies  Anti Sensory Left/Right Comparison     Stim Site L Lat (ms) R Lat (ms) L-R Lat (ms) L Amp (µV) R Amp (µV) L-R Amp (%) Site1 Site2 L Maximiliano (m/s) R Maximiliano (m/s) L-R Maximiliano (m/s)   Sup Fibular Anti Sensory (Lat ankle)  33.3°C   Lower leg 2.0 2.1 0.1 10.7 5.5 48.6 Lower leg Lat ankle 50 48 2   Site 2 1.9   9.8          Site 3 1.9   12.8          Sural Anti Sensory (Lat Mall)  39.5°C   Calf 2.7 2.9 0.2 9.8 16.3 39.9 Calf Lat Mall 52 48 4   Site 2 2.5 2.8 0.3 6.0 14.0 57.1        Site 3 2.8 2.8 0.0 4.8 14.1 66.0         2.5   9.7            Motor Left/Right Comparison     Stim Site L Lat (ms) R Lat (ms) L-R Lat (ms) L Amp (mV) R Amp (mV) L-R Amp (%) Site1 Site2 L Maximiliano (m/s) R Maximiliano (m/s) L-R Maximiliano (m/s)   Fibular Motor (Ext Dig Brev)  32.7°C   Ankle 3.0 2.7 0.3 3.2 2.2 31.3 Ankle Ext Dig Brev      B Fib 9.0 8.9 0.1 2.7 1.9 29.6 B Fib Ankle 50 52 2   Poplt 10.4 10.9 0.5 3.2 1.7 46.9 Poplt B Fib 71 50 21   Tibial Motor (Abd St Brev)  32.2°C   Ankle 3.4 4.3 0.9 3.9 7.1 45.1 Ankle Abd St Brev      Knee 10.4 11.1 0.7 3.4 4.2 19.0 Knee Ankle 49 53 4         Waveforms:

## 2018-09-05 RX ORDER — POTASSIUM CHLORIDE 20 MEQ/1
TABLET, EXTENDED RELEASE ORAL
Qty: 90 TAB | Refills: 0 | Status: SHIPPED | OUTPATIENT
Start: 2018-09-05 | End: 2018-12-09 | Stop reason: SDUPTHER

## 2018-10-03 ENCOUNTER — OFFICE VISIT (OUTPATIENT)
Dept: INTERNAL MEDICINE CLINIC | Age: 68
End: 2018-10-03

## 2018-10-03 VITALS
OXYGEN SATURATION: 99 % | DIASTOLIC BLOOD PRESSURE: 83 MMHG | RESPIRATION RATE: 16 BRPM | HEART RATE: 72 BPM | HEIGHT: 63 IN | SYSTOLIC BLOOD PRESSURE: 141 MMHG | WEIGHT: 161.6 LBS | TEMPERATURE: 96.5 F | BODY MASS INDEX: 28.63 KG/M2

## 2018-10-03 DIAGNOSIS — Z13.39 SCREENING FOR ALCOHOLISM: ICD-10-CM

## 2018-10-03 DIAGNOSIS — E11.9 TYPE 2 DIABETES MELLITUS WITHOUT COMPLICATION, WITHOUT LONG-TERM CURRENT USE OF INSULIN (HCC): ICD-10-CM

## 2018-10-03 DIAGNOSIS — M26.609 TMJ DYSFUNCTION: Primary | ICD-10-CM

## 2018-10-03 DIAGNOSIS — Z00.00 WELLNESS EXAMINATION: ICD-10-CM

## 2018-10-03 DIAGNOSIS — I10 ESSENTIAL HYPERTENSION: ICD-10-CM

## 2018-10-03 DIAGNOSIS — E66.3 OVERWEIGHT (BMI 25.0-29.9): ICD-10-CM

## 2018-10-03 DIAGNOSIS — Z13.31 SCREENING FOR DEPRESSION: ICD-10-CM

## 2018-10-03 DIAGNOSIS — E78.5 DYSLIPIDEMIA: ICD-10-CM

## 2018-10-03 NOTE — PROGRESS NOTES
Chief Complaint Patient presents with [facility] Annual Wellness Visit 1. Have you been to the ER, urgent care clinic since your last visit? Hospitalized since your last visit? No 
 
2. Have you seen or consulted any other health care providers outside of the [address] [place] [address] since your last visit? Include any pap smears or colon screening.  No

## 2018-10-03 NOTE — PATIENT INSTRUCTIONS
Take naproxen 250 mg twice a day after meals for 2 weeks Medicare Wellness Visit, Female The best way to live healthy is to have a lifestyle where you eat a well-balanced diet, exercise regularly, limit alcohol use, and quit all forms of tobacco/nicotine, if applicable. Regular preventive services are another way to keep healthy. Preventive services (vaccines, screening tests, monitoring & exams) can help personalize your care plan, which helps you manage your own care. Screening tests can find health problems at the earliest stages, when they are easiest to treat. [facility] follows the current, evidence-based guidelines published by the [facility] States Abhilash Nandini (USPSTF) when recommending preventive services for our patients. Because we follow these guidelines, sometimes recommendations change over time as research supports it. (For example, mammograms used to be recommended annually. Even though Medicare will still pay for an annual mammogram, the newer guidelines recommend a mammogram every two years for women of average risk.) Of course, you and your doctor may decide to screen more often for some diseases, based on your risk and your health status. Preventive services for you include: - Medicare offers their members a free annual wellness visit, which is time for you and your primary care provider to discuss and plan for your preventive service needs. Take advantage of this benefit every year! 
-All adults over the age of 72 should receive the recommended pneumonia vaccines. Current USPSTF guidelines recommend a series of two vaccines for the best pneumonia protection.  
-All adults should have a flu vaccine yearly and a tetanus vaccine every 10 years.  All adults age 61 and older should receive a shingles vaccine once in their lifetime.   
-A bone mass density test is recommended when a woman turns 65 to screen for osteoporosis. This test is only recommended one time, as a screening. Some providers will use this same test as a disease monitoring tool if you already have osteoporosis. -All adults age 38-68 who are overweight should have a diabetes screening test once every three years.  
-Other screening tests and preventive services for persons with diabetes include: an eye exam to screen for diabetic retinopathy, a kidney function test, a foot exam, and stricter control over your cholesterol.  
-Cardiovascular screening for adults with routine risk involves an electrocardiogram (ECG) at intervals determined by your doctor.  
-Colorectal cancer screenings should be done for adults age 54-65 with no increased risk factors for colorectal cancer. There are a number of acceptable methods of screening for this type of cancer. Each test has its own benefits and drawbacks. Discuss with your doctor what is most appropriate for you during your annual wellness visit. The different tests include: colonoscopy (considered the best screening method), a fecal occult blood test, a fecal DNA test, and sigmoidoscopy. -Breast cancer screenings are recommended every other year for women of normal risk, age 54-69. 
-Cervical cancer screenings for women over age 72 are only recommended with certain risk factors.  
-All adults born between [facility] should be screened once for Hepatitis C. Here is a list of your current Health Maintenance items (your personalized list of preventive services) with a due date: 
Health Maintenance Due Topic Date Due  Shingles Vaccine (1 of 2) 11/25/1999 [address] [place] [address] Annual Well Visit  05/16/2018  Flu Vaccine  08/01/2018  Hemoglobin A1C    09/23/2018

## 2018-10-03 NOTE — LETTER
10/20/2018 8:48 PM 
 
Ms. Ricky Velasco [id number] [facility] [address] [address] Dear Ricky Velasco: Please find your most recent results below. Resulted Orders APOLIPOPROTEIN B Result Value Ref Range Apolipoprotein B 71 54 - 133 mg/dL Narrative Performed at:  [address]  [id number] : Natalie Boyer MD, Phone:  [phone] HEMOGLOBIN A1C WITH EAG Result Value Ref Range Hemoglobin A1c 6.7 (H) 4.8 - 5.6 % Comment:  
            Prediabetes: 5.7 - 6.4 Diabetes: >6.4 Glycemic control for adults with diabetes: <7.0 Estimated average glucose 146 mg/dL Narrative Performed at:  [address]  [id number] : Natalie Boyer MD, Phone:  [phone] METABOLIC PANEL, BASIC Result Value Ref Range Glucose 66 65 - 99 mg/dL BUN 12 8 - 27 mg/dL Creatinine 0.65 0.57 - 1.00 mg/dL GFR est non-AA 92 >59 mL/min/1.73 GFR est  >59 mL/min/1.73  
 BUN/Creatinine ratio 18 12 - 28 Sodium 144 134 - 144 mmol/L Potassium 3.9 3.5 - 5.2 mmol/L Chloride 104 96 - 106 mmol/L  
 CO2 24 20 - 29 mmol/L Calcium 10.3 8.7 - 10.3 mg/dL Narrative Performed at:  [address]  [id number] : Natalie Boyer MD, Phone:  [phone] RECOMMENDATIONS: 
Labs are excellent. Please call me if you have any questions: [phone] Sincerely, Rishi Walker MD

## 2018-10-03 NOTE — MR AVS SNAPSHOT
[address] 
[phone] Patient: Manish Lacy MRN: [id number] YPT:18/[birth year]26 Visit Information Date & Time Provider Department Dept. Phone Encounter #  
 10/3/2018  2:45 PM Yasmany Yusuf MD [facility] [phone] [id number] Upcoming Health Maintenance Date Due Shingrix Vaccine Age 50> (1 of 2) 11/25/1999 MEDICARE YEARLY EXAM 5/16/2018 Influenza Age 5 to Adult 8/1/2018 HEMOGLOBIN A1C Q6M 9/23/2018 FOOT EXAM Q1 12/3/2018* Pneumococcal 65+ Low/Medium Risk (2 of 2 - PPSV23) 4/3/2019* MICROALBUMIN Q1 3/23/2019 LIPID PANEL Q1 3/23/2019 FOBT Q 1 YEAR AGE 50-75 3/23/2019 GLAUCOMA SCREENING Q2Y 8/28/2019 BREAST CANCER SCRN MAMMOGRAM 8/28/2019 DTaP/Tdap/Td series (2 - Td) 8/28/2027 *Topic was postponed. The date shown is not the original due date. Allergies as of 10/3/2018  Review Complete On: 10/3/2018 By: Georgia Bronson Severity Noted Reaction Type Reactions Percocet [Oxycodone-acetaminophen]  10/08/2014    Rash, Itching Current Immunizations  Never Reviewed No immunizations on file. Not reviewed this visit You Were Diagnosed With   
  
 Codes Comments TMJ dysfunction    -  Primary ICD-10-CM: M26.609 ICD-9-CM: 524.60 Type 2 diabetes mellitus without complication, without long-term current use of insulin (HCC)     ICD-10-CM: E11.9 ICD-9-CM: 250.00 Essential hypertension     ICD-10-CM: I10 
ICD-9-CM: 401.9 Dyslipidemia     ICD-10-CM: E78.5 ICD-9-CM: 272.4 Overweight (BMI 25.0-29. 9)     ICD-10-CM: E99.3 ICD-9-CM: 278.02 Vitals BP Pulse Temp Resp Height(growth percentile) Weight(growth percentile) 141/83 72 96.5 °F (35.8 °C) (Oral) 16 5' 3\" (1.6 m) 161 lb 9.6 oz (73.3 kg) SpO2 BMI OB Status Smoking Status 99% 28.63 kg/m2 Hysterectomy Never Smoker Vitals History BMI and BSA Data Body Mass Index Body Surface Area  
 28.63 kg/m 2 1.81 m 2 Preferred Pharmacy Pharmacy Name Phone [facility] [address], [address] [facility] [phone] Your Updated Medication List  
  
   
This list is accurate as of 10/3/18  4:36 PM.  Always use your most recent med list.  
  
  
  
  
 albuterol 2.5 mg /3 mL (0.083 %) nebulizer solution Commonly known as:  PROVENTIL VENTOLIN  
3 mL by Nebulization route every four (4) hours as needed for Wheezing. amLODIPine 10 mg tablet Commonly known as:  Leander Jihan TAKE 1 TABLET BY MOUTH EVERY DAY  
  
 dorzolamide 2 % ophthalmic solution Commonly known as:  TRUSOPT  
  
 gabapentin 100 mg capsule Commonly known as:  NEURONTIN  
TAKE ONE CAPSULE BY MOUTH THREE TIMES DAILY JANUVIA 100 mg tablet Generic drug:  SITagliptin Take 100 mg by mouth daily. lisinopril-hydroCHLOROthiazide 20-12.5 mg per tablet Commonly known as:  PRINZIDE, ZESTORETIC  
TAKE 1 TABLET BY MOUTH DAILY  
  
 metFORMIN  mg tablet Commonly known as:  GLUCOPHAGE XR  
TAKE 2 TABLETS BY MOUTH TWICE DAILY NexIUM 40 mg capsule Generic drug:  esomeprazole  
  
 potassium chloride 20 mEq tablet Commonly known as:  K-DUR, KLOR-CON  
TAKE 1 TABLET BY MOUTH DAILY pravastatin 80 mg tablet Commonly known as:  PRAVACHOL  
TAKE 1 TABLET BY MOUTH EVERY NIGHT AT BEDTIME  
  
 TRAVATAN Z 0.004 % ophthalmic solution Generic drug:  travoprost  
  
 TYLENOL EXTRA STRENGTH 500 mg tablet Generic drug:  acetaminophen Take 500 mg by mouth every six (6) hours as needed for Pain. We Performed the Following APOLIPOPROTEIN B [id number] CPT(R)] HEMOGLOBIN A1C WITH EAG [68778 CPT(R)] METABOLIC PANEL, BASIC [47230 CPT(R)] Patient Instructions Take naproxen 250 mg twice a day after meals for 2 weeks Introducing \[facility]\"" & HEALTH SERVICES! [facility] introduces [employer] patient portal. Now you can access parts of your medical record, email your doctor's office, and request medication refills online. 1. In your internet browser, go to [email or web address] 2. Click on the First Time User? Click Here link in the Sign In box. You will see the New Member Sign Up page. 3. Enter your [employer] Access Code exactly as it appears below. You will not need to use this code after youve completed the sign-up process. If you do not sign up before the expiration date, you must request a new code. · [email or web address] Access Code: [email or web address] Expires: 10/9/2018 12:15 PM 
 
4. Enter the last four digits of your Social Security Number (xxxx) and Date of Birth (mm/dd/yyyy) as indicated and click Submit. You will be taken to the next sign-up page. 5. Create a [employer] ID. This will be your [email or web address] login ID and cannot be changed, so think of one that is secure and easy to remember. 6. Create a [employer] password. You can change your password at any time. 7. Enter your Password Reset Question and Answer. This can be used at a later time if you forget your password. 8. Enter your e-mail address. You will receive e-mail notification when new information is available in [address] 9. Click Sign Up. You can now view and download portions of your medical record. 10. Click the Download Summary menu link to download a portable copy of your medical information. If you have questions, please visit the Frequently Asked Questions section of the [email or web address] website. Remember, [employer] is NOT to be used for urgent needs. For medical emergencies, dial 911. Now available from your iPhone and Android! Please provide this summary of care documentation to your next provider. Your primary care clinician is listed as Jeovany Aguirre. If you have any questions after today's visit, please call [phone].

## 2018-10-04 LAB
APO B SERPL-MCNC: 71 MG/DL (ref 54–133)
BUN SERPL-MCNC: 12 MG/DL (ref 8–27)
BUN/CREAT SERPL: 18 (ref 12–28)
CALCIUM SERPL-MCNC: 10.3 MG/DL (ref 8.7–10.3)
CHLORIDE SERPL-SCNC: 104 MMOL/L (ref 96–106)
CO2 SERPL-SCNC: 24 MMOL/L (ref 20–29)
CREAT SERPL-MCNC: 0.65 MG/DL (ref 0.57–1)
EST. AVERAGE GLUCOSE BLD GHB EST-MCNC: 146 MG/DL
GLUCOSE SERPL-MCNC: 66 MG/DL (ref 65–99)
HBA1C MFR BLD: 6.7 % (ref 4.8–5.6)
POTASSIUM SERPL-SCNC: 3.9 MMOL/L (ref 3.5–5.2)
SODIUM SERPL-SCNC: 144 MMOL/L (ref 134–144)

## 2018-10-04 NOTE — PROGRESS NOTES
[address] [place] [address] and Primary Care 
[place] [address] 
[address] [address] Phone:  [phone]  Fax: [phone] Chief Complaint Patient presents with [facility] Annual Wellness Visit Dilan Mendez SUBJECTIVE: 
  Tonia Beckett is a 75 y.o. female Comes in for return visit stating she's done fairly well. She complains of pain in her right ear. This has been present for the last several weeks. There has been no history of trauma. She continues to lose weight, which is great. She has a past history of diabetes, primary hypertension and dyslipidemia. She admits compliance with all of her medications. She does not check blood sugars on a consistent basis, which is fine because historically Hgb A1cs have been excellent. Current Outpatient Prescriptions Medication Sig Dispense Refill  potassium chloride (K-DUR, KLOR-CON) 20 mEq tablet TAKE 1 TABLET BY MOUTH DAILY 90 Tab 0  
 lisinopril-hydroCHLOROthiazide (PRINZIDE, ZESTORETIC) 20-12.5 mg per tablet TAKE 1 TABLET BY MOUTH DAILY 90 Tab 3  
 amLODIPine (NORVASC) 10 mg tablet TAKE 1 TABLET BY MOUTH EVERY DAY 90 Tab 3  
 acetaminophen (TYLENOL EXTRA STRENGTH) 500 mg tablet Take 500 mg by mouth every six (6) hours as needed for Pain.  gabapentin (NEURONTIN) 100 mg capsule TAKE ONE CAPSULE BY MOUTH THREE TIMES DAILY 270 Cap 3  
 metFORMIN ER (GLUCOPHAGE XR) 500 mg tablet TAKE 2 TABLETS BY MOUTH TWICE DAILY 360 Tab 3  
 albuterol (PROVENTIL VENTOLIN) 2.5 mg /3 mL (0.083 %) nebulizer solution 3 mL by Nebulization route every four (4) hours as needed for Wheezing. 100 Each 11  
 pravastatin (PRAVACHOL) 80 mg tablet TAKE 1 TABLET BY MOUTH EVERY NIGHT AT BEDTIME 90 Tab 3  
 dorzolamide (TRUSOPT) 2 % ophthalmic solution  NEXIUM 40 mg capsule  TRAVATAN Z 0.004 % ophthalmic solution  sitagliptin (JANUVIA) 100 mg tablet Take 100 mg by mouth daily. Past Medical History:  
Diagnosis Date  Asthma  Diabetes ([facility] [address]  Glaucoma  Hypercholesterolemia  Hypertension Past Surgical History:  
Procedure Laterality Date  HX COLONOSCOPY    
 HX GYN    
 status post hysterectomy,BSO,and C-sections x3  
 HX ORTHOPAEDIC Arthroscopic surgery for both knees Allergies Allergen Reactions  Percocet [Oxycodone-Acetaminophen] Rash and Itching REVIEW OF SYSTEMS: 
General: negative for - chills or fever ENT: negative for - headaches, nasal congestion or tinnitus Respiratory: negative for - cough, hemoptysis, shortness of breath or wheezing Cardiovascular : negative for - chest pain, edema, palpitations or shortness of breath Gastrointestinal: negative for - abdominal pain, blood in stools, heartburn or nausea/vomiting Genito-Urinary: no dysuria, trouble voiding, or hematuria Musculoskeletal: negative for - gait disturbance, joint pain, joint stiffness or joint swelling Neurological: no TIA or stroke symptoms Hematologic: no bruises, no bleeding, no swollen glands Integument: no lumps, mole changes, nail changes or rash Endocrine: no malaise/lethargy or unexpected weight changes Social History Social History  Marital status: SINGLE Spouse name: N/A  
 Number of children: N/A  
 Years of education: N/A Occupational History  retired [employer] Social History Main Topics  Smoking status: Never Smoker  Smokeless tobacco: Never Used  Alcohol use 0.5 oz/week 1 Cans of beer per week  Drug use: No  
 Sexual activity: Not Currently Other Topics Concern  None Social History Narrative Family History Problem Relation Age of Onset  Cancer Mother  Lung Disease Father  Cancer Brother  Anesth Problems Neg Hx OBJECTIVE: 
 
Visit Vitals  /83  Pulse 72  Temp 96.5 °F (35.8 °C) (Oral)  Resp 16  
 Ht 5' 3\" (1.6 m)  Wt 161 lb 9.6 oz (73.3 kg)  SpO2 99%  BMI 28.63 kg/m2 CONSTITUTIONAL: well , well nourished, appears age appropriate EYES: perrla, eom intact ENMT:moist mucous membranes, pharynx clear, tenderness to palpation right TMJ NECK: supple. Thyroid normal 
RESPIRATORY: Chest: clear to ascultation and percussion CARDIOVASCULAR: Heart: regular rate and rhythm GASTROINTESTINAL: Abdomen: soft, bowel sounds active HEMATOLOGIC: no pathological lymph nodes palpated MUSCULOSKELETAL: Extremities: no edema, pulse 1+ INTEGUMENT: No unusual rashes or suspicious skin lesions noted. Nails appear normal. 
NEUROLOGIC: non-focal exam  
MENTAL STATUS: alert and oriented, appropriate affect ASSESSMENT: 
1. TMJ dysfunction 2. Type 2 diabetes mellitus without complication, without long-term current use of insulin ([address] 3. Essential hypertension 4. Dyslipidemia 5. Overweight (BMI 25.0-29.9) 6. Screening for alcoholism 7. Screening for depression 8. Wellness examination PLAN: 
 
1. She has TMJ dysfunction on the right. I suggest Naproxen 250 mg b.i.d. for the next 2-3 weeks. 2. Her diabetes hopefully is doing well, but I will await the results of her Hgb A1c. 
3. BP is excellent today, no adjustments are made. 4. She'll continue statin as prescribed in view of her primary cardiovascular risk prevention status. 5. I encouraged her to continue weight loss. This means eating meals, avoiding sweets and eliminating the consumption of processed carbs. . 
Orders Placed This Encounter  Depression Screen Annual  
 APOLIPOPROTEIN B  
 HEMOGLOBIN A1C WITH EAG  
 METABOLIC PANEL, BASIC Follow-up Disposition: Not on File Alessandra Paz MD 
 
 
This is the Subsequent Medicare Annual Wellness Exam, performed 12 months or more after the Initial AWV or the last Subsequent AWV I have reviewed the patient's medical history in detail and updated the computerized patient record. History Past Medical History:  
Diagnosis Date  Asthma  Diabetes ([facility] [address]  Glaucoma  Hypercholesterolemia  Hypertension Past Surgical History:  
Procedure Laterality Date  HX COLONOSCOPY    
 HX GYN    
 status post hysterectomy,BSO,and C-sections x3  
 HX ORTHOPAEDIC Arthroscopic surgery for both knees Current Outpatient Prescriptions Medication Sig Dispense Refill  potassium chloride (K-DUR, KLOR-CON) 20 mEq tablet TAKE 1 TABLET BY MOUTH DAILY 90 Tab 0  
 lisinopril-hydroCHLOROthiazide (PRINZIDE, ZESTORETIC) 20-12.5 mg per tablet TAKE 1 TABLET BY MOUTH DAILY 90 Tab 3  
 amLODIPine (NORVASC) 10 mg tablet TAKE 1 TABLET BY MOUTH EVERY DAY 90 Tab 3  
 acetaminophen (TYLENOL EXTRA STRENGTH) 500 mg tablet Take 500 mg by mouth every six (6) hours as needed for Pain.  gabapentin (NEURONTIN) 100 mg capsule TAKE ONE CAPSULE BY MOUTH THREE TIMES DAILY 270 Cap 3  
 metFORMIN ER (GLUCOPHAGE XR) 500 mg tablet TAKE 2 TABLETS BY MOUTH TWICE DAILY 360 Tab 3  
 albuterol (PROVENTIL VENTOLIN) 2.5 mg /3 mL (0.083 %) nebulizer solution 3 mL by Nebulization route every four (4) hours as needed for Wheezing. 100 Each 11  
 pravastatin (PRAVACHOL) 80 mg tablet TAKE 1 TABLET BY MOUTH EVERY NIGHT AT BEDTIME 90 Tab 3  
 dorzolamide (TRUSOPT) 2 % ophthalmic solution  NEXIUM 40 mg capsule  TRAVATAN Z 0.004 % ophthalmic solution  sitagliptin (JANUVIA) 100 mg tablet Take 100 mg by mouth daily. Allergies Allergen Reactions  Percocet [Oxycodone-Acetaminophen] Rash and Itching Family History Problem Relation Age of Onset  Cancer Mother  Lung Disease Father  Cancer Brother  Anesth Problems Neg Hx Social History Substance Use Topics  Smoking status: Never Smoker  Smokeless tobacco: Never Used  Alcohol use 0.5 oz/week 1 Cans of beer per week Patient Active Problem List  
Diagnosis Code  Hypertension I10  
 Diabetes mellitus ([facility] [address] E11.9  Dyslipidemia E78.5  Osteoarthritis M19.90  
 Reactive airway disease J45.909  Rhinitis J31.0  Acute midline low back pain without sciatica M54.5  Obesity (BMI 30.0-34. 9) E66.9  Right bundle branch block I45.10  Overweight (BMI 25.0-29. 9) E66.3 Depression Risk Factor Screening: PHQ over the last two weeks 10/3/2018 PHQ Not Done - Little interest or pleasure in doing things Not at all Feeling down, depressed, irritable, or hopeless Not at all Total Score PHQ 2 0 Alcohol Risk Factor Screening: You do not drink alcohol or very rarely. Functional Ability and Level of Safety:  
Hearing Loss Hearing is good. Activities of Daily Living The home contains: no safety equipment. Patient does total self care Fall Risk Fall Risk Assessment, last 12 mths 10/3/2018 Able to walk? Yes Fall in past 12 months? No  
Fall with injury? -  
Number of falls in past 12 months - Fall Risk Score -  
 
 
Abuse Screen Patient is not abused Cognitive Screening Evaluation of Cognitive Function: 
Has your family/caregiver stated any concerns about your memory: no 
Normal 
 
Patient Care Team  
Patient Care Team: 
Michael Ayoub MD as PCP - General (Internal Medicine) Celine Yarbrough RN as Ambulatory Care Navigator Assessment/Plan Education and counseling provided: 
Are appropriate based on today's review and evaluation Diagnoses and all orders for this visit: 1. TMJ dysfunction 2. Type 2 diabetes mellitus without complication, without long-term current use of insulin (HCC) 
-     HEMOGLOBIN A1C WITH EAG 3. Essential hypertension -     METABOLIC PANEL, BASIC 4. Dyslipidemia -     APOLIPOPROTEIN B 
 
5. Overweight (BMI 25.0-29.9) 6. Screening for alcoholism -     Annual  Alcohol Screen 15 min () 7. Screening for depression -     Depression Screen Annual 
 
8. Wellness examination Health Maintenance Due Topic Date Due  
  Shingrix Vaccine Age 50> (1 of 2) 11/25/1999  Influenza Age 5 to Adult  08/01/2018  HEMOGLOBIN A1C Q6M  09/23/2018

## 2018-12-09 RX ORDER — POTASSIUM CHLORIDE 20 MEQ/1
TABLET, EXTENDED RELEASE ORAL
Qty: 90 TAB | Refills: 0 | Status: SHIPPED | OUTPATIENT
Start: 2018-12-09 | End: 2019-03-06 | Stop reason: SDUPTHER

## 2019-01-16 ENCOUNTER — OFFICE VISIT (OUTPATIENT)
Dept: INTERNAL MEDICINE CLINIC | Age: 69
End: 2019-01-16

## 2019-01-16 VITALS
DIASTOLIC BLOOD PRESSURE: 79 MMHG | RESPIRATION RATE: 16 BRPM | WEIGHT: 157.8 LBS | BODY MASS INDEX: 27.96 KG/M2 | HEART RATE: 70 BPM | HEIGHT: 63 IN | SYSTOLIC BLOOD PRESSURE: 131 MMHG | OXYGEN SATURATION: 99 % | TEMPERATURE: 98.1 F

## 2019-01-16 DIAGNOSIS — E11.9 TYPE 2 DIABETES MELLITUS WITHOUT COMPLICATION, WITHOUT LONG-TERM CURRENT USE OF INSULIN (HCC): ICD-10-CM

## 2019-01-16 DIAGNOSIS — E78.5 DYSLIPIDEMIA: ICD-10-CM

## 2019-01-16 DIAGNOSIS — L97.521 SKIN ULCER OF LEFT FOOT, LIMITED TO BREAKDOWN OF SKIN (HCC): Primary | ICD-10-CM

## 2019-01-16 DIAGNOSIS — T14.8XXA BONE FRACTURE: ICD-10-CM

## 2019-01-16 DIAGNOSIS — J45.30 MILD PERSISTENT REACTIVE AIRWAY DISEASE WITHOUT COMPLICATION: ICD-10-CM

## 2019-01-16 DIAGNOSIS — I10 ESSENTIAL HYPERTENSION: ICD-10-CM

## 2019-01-16 RX ORDER — INSULIN PUMP SYRINGE, 3 ML
EACH MISCELLANEOUS
Qty: 1 KIT | Refills: 0 | Status: SHIPPED | OUTPATIENT
Start: 2019-01-16

## 2019-01-16 NOTE — PROGRESS NOTES
[facility] and Primary Care 
[place] [address] 
[address] [address] Phone:  [phone]  Fax: [phone] Chief Complaint Patient presents with  Cold Patient of complains chest and back pain due to cough. .   
 
SUBJECTIVE: 
  Tamir Xiong is a 70 y.o. female Comes in for return visit having fallen down the stairs fracturing her left ankle. She was placed in a boot and has seen an orthopedic physician in [place]. Her follow up appointment is in the next week. Unfortunately she developed an ulcer on the ventral aspect of the right foot at the junction of the ankle. She has had a cough typically worse nocturnally with audible wheezing present. This has been in existence for the last several weeks. Her diabetes historically has been doing quite well. She has a past history of primary hypertension and dyslipidemia. Current Outpatient Medications Medication Sig Dispense Refill  Blood-Glucose Meter monitoring kit Blood sugar checks daily 1 Kit 0  
 potassium chloride (K-DUR, KLOR-CON) 20 mEq tablet TAKE 1 TABLET BY MOUTH DAILY 90 Tab 0  pravastatin (PRAVACHOL) 80 mg tablet TAKE 1 TABLET BY MOUTH EVERY NIGHT AT BEDTIME 90 Tab 3  
 lisinopril-hydroCHLOROthiazide (PRINZIDE, ZESTORETIC) 20-12.5 mg per tablet TAKE 1 TABLET BY MOUTH DAILY 90 Tab 3  
 amLODIPine (NORVASC) 10 mg tablet TAKE 1 TABLET BY MOUTH EVERY DAY 90 Tab 3  
 acetaminophen (TYLENOL EXTRA STRENGTH) 500 mg tablet Take 500 mg by mouth every six (6) hours as needed for Pain.  gabapentin (NEURONTIN) 100 mg capsule TAKE ONE CAPSULE BY MOUTH THREE TIMES DAILY 270 Cap 3  
 metFORMIN ER (GLUCOPHAGE XR) 500 mg tablet TAKE 2 TABLETS BY MOUTH TWICE DAILY 360 Tab 3  
 albuterol (PROVENTIL VENTOLIN) 2.5 mg /3 mL (0.083 %) nebulizer solution 3 mL by Nebulization route every four (4) hours as needed for Wheezing.  100 Each 11  
  dorzolamide (TRUSOPT) 2 % ophthalmic solution  NEXIUM 40 mg capsule  TRAVATAN Z 0.004 % ophthalmic solution  sitagliptin (JANUVIA) 100 mg tablet Take 100 mg by mouth daily. Past Medical History:  
Diagnosis Date  Asthma  Diabetes ([facility] [address]  Glaucoma  Hypercholesterolemia  Hypertension Past Surgical History:  
Procedure Laterality Date  HX COLONOSCOPY    
 HX GYN    
 status post hysterectomy,BSO,and C-sections x3  
 HX ORTHOPAEDIC Arthroscopic surgery for both knees Allergies Allergen Reactions  Percocet [Oxycodone-Acetaminophen] Rash and Itching REVIEW OF SYSTEMS: 
General: negative for - chills or fever ENT: negative for - headaches, nasal congestion or tinnitus Respiratory: negative for - cough, hemoptysis, shortness of breath or wheezing Cardiovascular : negative for - chest pain, edema, palpitations or shortness of breath Gastrointestinal: negative for - abdominal pain, blood in stools, heartburn or nausea/vomiting Genito-Urinary: no dysuria, trouble voiding, or hematuria Musculoskeletal: negative for - gait disturbance, joint pain, joint stiffness or joint swelling Neurological: no TIA or stroke symptoms Hematologic: no bruises, no bleeding, no swollen glands Integument: no lumps, mole changes, nail changes or rash Endocrine: no malaise/lethargy or unexpected weight changes Social History Socioeconomic History  Marital status: SINGLE Spouse name: Not on file  Number of children: Not on file  Years of education: Not on file  Highest education level: Not on file Occupational History  Occupation: [employer] Tobacco Use  Smoking status: Never Smoker  Smokeless tobacco: Never Used Substance and Sexual Activity  Alcohol use: Yes Alcohol/week: 0.5 oz Types: 1 Cans of beer per week  Drug use: No  
 Sexual activity: Not Currently Family History Problem Relation Age of Onset  Cancer Mother  Lung Disease Father  Cancer Brother  Anesth Problems Neg Hx OBJECTIVE: 
 
Visit Vitals /79 Pulse 70 Temp 98.1 °F (36.7 °C) (Oral) Resp 16 Ht 5' 3\" (1.6 m) Wt 157 lb 12.8 oz (71.6 kg) Comment: weighed with orthopedic boot SpO2 99% BMI 27.95 kg/m² CONSTITUTIONAL: well , well nourished, appears age appropriate EYES: perrla, eom intact ENMT:moist mucous membranes, pharynx clear NECK: supple. Thyroid normal 
RESPIRATORY: Chest: clear to ascultation and percussion CARDIOVASCULAR: Heart: regular rate and rhythm GASTROINTESTINAL: Abdomen: soft, bowel sounds active HEMATOLOGIC: no pathological lymph nodes palpated MUSCULOSKELETAL: Extremities: no edema, pulse 1+ INTEGUMENT: No unusual rashes or suspicious skin lesions noted. Nails appear normal. 
NEUROLOGIC: non-focal exam  
MENTAL STATUS: alert and oriented, appropriate affect ASSESSMENT: 
1. Skin ulcer of left foot, limited to breakdown of skin ([address] 2. Bone fracture 3. Type 2 diabetes mellitus without complication, without long-term current use of insulin ([address] 4. Dyslipidemia 5. Essential hypertension 6. Mild persistent reactive airway disease without complication PLAN: 
 
1. As far as the ulcer is concerned, she needs to use 3-4 4x4's to reduce the pressure on this particular site. She will follow up with the orthopedic physician regarding the fracture. 2. Her diabetes historically has been doing quite well. I will await the results of her hemoglobin A1c. 
3. She will continue statin as prescribed in view of her primary cardiovascular risk prevention status. 4. Her blood pressure is excellent today, no adjustments are made. 5. She does have mild reactive airway disease and will be treated with an inhaler, specifically Breo, one inhalation daily. . 
Orders Placed This Encounter  HEMOGLOBIN A1C WITH EAG  
 APOLIPOPROTEIN B  
  METABOLIC PANEL, BASIC  Blood-Glucose Meter monitoring kit Follow-up Disposition: 
Return in about 3 months (around 4/16/2019).  
 
 
Jluis Mays MD

## 2019-01-16 NOTE — PROGRESS NOTES
Chief Complaint Patient presents with  Cold Patient of complains chest and back pain due to cough. 1. Have you been to the ER, urgent care clinic since your last visit? Hospitalized since your last visit? Yes When: 12/21/19 Where: [facility] ER Reason for visit: fall/fractured ankle 2. Have you seen or consulted any other health care providers outside of the [address] [place] [address] since your last visit? Include any pap smears or colon screening.  No

## 2019-01-17 LAB
APO B SERPL-MCNC: 63 MG/DL (ref 54–133)
BUN SERPL-MCNC: 12 MG/DL (ref 8–27)
BUN/CREAT SERPL: 15 (ref 12–28)
CALCIUM SERPL-MCNC: 10.7 MG/DL (ref 8.7–10.3)
CHLORIDE SERPL-SCNC: 107 MMOL/L (ref 96–106)
CO2 SERPL-SCNC: 24 MMOL/L (ref 20–29)
CREAT SERPL-MCNC: 0.81 MG/DL (ref 0.57–1)
EST. AVERAGE GLUCOSE BLD GHB EST-MCNC: 140 MG/DL
GLUCOSE SERPL-MCNC: 97 MG/DL (ref 65–99)
HBA1C MFR BLD: 6.5 % (ref 4.8–5.6)
POTASSIUM SERPL-SCNC: 4.3 MMOL/L (ref 3.5–5.2)
SODIUM SERPL-SCNC: 147 MMOL/L (ref 134–144)

## 2019-03-06 RX ORDER — POTASSIUM CHLORIDE 20 MEQ/1
TABLET, EXTENDED RELEASE ORAL
Qty: 90 TAB | Refills: 0 | Status: SHIPPED | OUTPATIENT
Start: 2019-03-06 | End: 2019-04-24 | Stop reason: SDUPTHER

## 2019-03-14 RX ORDER — AMLODIPINE BESYLATE 10 MG/1
10 TABLET ORAL DAILY
Qty: 90 TAB | Refills: 3 | Status: SHIPPED | OUTPATIENT
Start: 2019-03-14 | End: 2019-04-24 | Stop reason: SDUPTHER

## 2019-03-14 RX ORDER — PRAVASTATIN SODIUM 80 MG/1
80 TABLET ORAL
Qty: 90 TAB | Refills: 3 | Status: SHIPPED | OUTPATIENT
Start: 2019-03-14 | End: 2019-11-28 | Stop reason: CLARIF

## 2019-03-14 RX ORDER — LANCETS
EACH MISCELLANEOUS
Qty: 100 EACH | Refills: 11 | Status: SHIPPED | OUTPATIENT
Start: 2019-03-14 | End: 2020-06-01 | Stop reason: SDUPTHER

## 2019-03-14 RX ORDER — GABAPENTIN 100 MG/1
100 CAPSULE ORAL 3 TIMES DAILY
Qty: 270 CAP | Refills: 3 | Status: SHIPPED | OUTPATIENT
Start: 2019-03-14 | End: 2019-08-07 | Stop reason: SDUPTHER

## 2019-04-24 ENCOUNTER — OFFICE VISIT (OUTPATIENT)
Dept: INTERNAL MEDICINE CLINIC | Age: 69
End: 2019-04-24

## 2019-04-24 VITALS
HEIGHT: 63 IN | BODY MASS INDEX: 27.96 KG/M2 | SYSTOLIC BLOOD PRESSURE: 136 MMHG | OXYGEN SATURATION: 98 % | TEMPERATURE: 96.1 F | WEIGHT: 157.8 LBS | DIASTOLIC BLOOD PRESSURE: 82 MMHG | RESPIRATION RATE: 18 BRPM | HEART RATE: 70 BPM

## 2019-04-24 DIAGNOSIS — E11.9 TYPE 2 DIABETES MELLITUS WITHOUT COMPLICATION, WITHOUT LONG-TERM CURRENT USE OF INSULIN (HCC): Primary | ICD-10-CM

## 2019-04-24 DIAGNOSIS — I10 ESSENTIAL HYPERTENSION: ICD-10-CM

## 2019-04-24 DIAGNOSIS — E78.5 DYSLIPIDEMIA: ICD-10-CM

## 2019-04-24 DIAGNOSIS — E66.3 OVERWEIGHT (BMI 25.0-29.9): ICD-10-CM

## 2019-04-24 RX ORDER — POTASSIUM CHLORIDE 20 MEQ/1
TABLET, EXTENDED RELEASE ORAL
Qty: 90 TAB | Refills: 3 | Status: SHIPPED | OUTPATIENT
Start: 2019-04-24 | End: 2020-06-14

## 2019-04-24 RX ORDER — LISINOPRIL AND HYDROCHLOROTHIAZIDE 12.5; 2 MG/1; MG/1
TABLET ORAL
Qty: 90 TAB | Refills: 3 | Status: SHIPPED | OUTPATIENT
Start: 2019-04-24 | End: 2019-08-07 | Stop reason: SDUPTHER

## 2019-04-24 RX ORDER — AMLODIPINE BESYLATE 10 MG/1
10 TABLET ORAL DAILY
Qty: 90 TAB | Refills: 3 | Status: SHIPPED | OUTPATIENT
Start: 2019-04-24 | End: 2019-08-07 | Stop reason: SDUPTHER

## 2019-04-24 NOTE — PROGRESS NOTES
1. Have you been to the ER, urgent care clinic since your last visit? Hospitalized since your last visit? No    2. Have you seen or consulted any other health care providers outside of the [address] [place] [address] since your last visit? Include any pap smears or colon screening.  No

## 2019-04-25 LAB
ALBUMIN SERPL-MCNC: 4.4 G/DL (ref 3.6–4.8)
ALBUMIN/GLOB SERPL: 1.8 {RATIO} (ref 1.2–2.2)
ALP SERPL-CCNC: 77 IU/L (ref 39–117)
ALT SERPL-CCNC: 16 IU/L (ref 0–32)
APO B SERPL-MCNC: 75 MG/DL
APPEARANCE UR: CLEAR
AST SERPL-CCNC: 13 IU/L (ref 0–40)
BASOPHILS # BLD AUTO: 0 X10E3/UL (ref 0–0.2)
BASOPHILS NFR BLD AUTO: 0 %
BILIRUB SERPL-MCNC: 0.6 MG/DL (ref 0–1.2)
BILIRUB UR QL STRIP: NEGATIVE
BUN SERPL-MCNC: 13 MG/DL (ref 8–27)
BUN/CREAT SERPL: 18 (ref 12–28)
CALCIUM SERPL-MCNC: 10.3 MG/DL (ref 8.7–10.3)
CHLORIDE SERPL-SCNC: 104 MMOL/L (ref 96–106)
CHOLEST SERPL-MCNC: 149 MG/DL (ref 100–199)
CO2 SERPL-SCNC: 26 MMOL/L (ref 20–29)
COLOR UR: YELLOW
CREAT SERPL-MCNC: 0.71 MG/DL (ref 0.57–1)
EOSINOPHIL # BLD AUTO: 0.1 X10E3/UL (ref 0–0.4)
EOSINOPHIL NFR BLD AUTO: 1 %
ERYTHROCYTE [DISTWIDTH] IN BLOOD BY AUTOMATED COUNT: 13.6 % (ref 12.3–15.4)
EST. AVERAGE GLUCOSE BLD GHB EST-MCNC: 134 MG/DL
GLOBULIN SER CALC-MCNC: 2.4 G/DL (ref 1.5–4.5)
GLUCOSE SERPL-MCNC: 152 MG/DL (ref 65–99)
GLUCOSE UR QL: ABNORMAL
HBA1C MFR BLD: 6.3 % (ref 4.8–5.6)
HCT VFR BLD AUTO: 38 % (ref 34–46.6)
HDLC SERPL-MCNC: 58 MG/DL
HGB BLD-MCNC: 12.1 G/DL (ref 11.1–15.9)
HGB UR QL STRIP: NEGATIVE
IMM GRANULOCYTES # BLD AUTO: 0 X10E3/UL (ref 0–0.1)
IMM GRANULOCYTES NFR BLD AUTO: 0 %
KETONES UR QL STRIP: NEGATIVE
LDLC SERPL CALC-MCNC: 60 MG/DL (ref 0–99)
LEUKOCYTE ESTERASE UR QL STRIP: NEGATIVE
LYMPHOCYTES # BLD AUTO: 2.9 X10E3/UL (ref 0.7–3.1)
LYMPHOCYTES NFR BLD AUTO: 54 %
MCH RBC QN AUTO: 28.1 PG (ref 26.6–33)
MCHC RBC AUTO-ENTMCNC: 31.8 G/DL (ref 31.5–35.7)
MCV RBC AUTO: 88 FL (ref 79–97)
MICRO URNS: ABNORMAL
MONOCYTES # BLD AUTO: 0.4 X10E3/UL (ref 0.1–0.9)
MONOCYTES NFR BLD AUTO: 7 %
NEUTROPHILS # BLD AUTO: 2.1 X10E3/UL (ref 1.4–7)
NEUTROPHILS NFR BLD AUTO: 38 %
NITRITE UR QL STRIP: NEGATIVE
PH UR STRIP: 6 [PH] (ref 5–7.5)
PLATELET # BLD AUTO: 202 X10E3/UL (ref 150–379)
POTASSIUM SERPL-SCNC: 4.1 MMOL/L (ref 3.5–5.2)
PROT SERPL-MCNC: 6.8 G/DL (ref 6–8.5)
PROT UR QL STRIP: NEGATIVE
RBC # BLD AUTO: 4.31 X10E6/UL (ref 3.77–5.28)
SODIUM SERPL-SCNC: 147 MMOL/L (ref 134–144)
SP GR UR: 1.02 (ref 1–1.03)
TRIGL SERPL-MCNC: 153 MG/DL (ref 0–149)
TSH SERPL DL<=0.005 MIU/L-ACNC: 0.91 UIU/ML (ref 0.45–4.5)
UROBILINOGEN UR STRIP-MCNC: 0.2 MG/DL (ref 0.2–1)
VLDLC SERPL CALC-MCNC: 31 MG/DL (ref 5–40)
WBC # BLD AUTO: 5.5 X10E3/UL (ref 3.4–10.8)

## 2019-04-27 NOTE — PROGRESS NOTES
[facility] and Primary Care  [place] [address]  [address] [place] [address]  Phone:  [phone]  Fax: [phone]       Chief Complaint   Patient presents with    Hypertension   . SUBJECTIVE:    Darryle Notch is a 70 y.o. female Comes in for return visit stating that she has done well. She has no complaints. She has been taking all of her medications as prescribed. She has a history of diabetes, primary hypertension and dyslipidemia. Additionally, she is overweight and has been progressively losing. She remains quite physically active. Current Outpatient Medications   Medication Sig Dispense Refill    potassium chloride (K-DUR, KLOR-CON) 20 mEq tablet TAKE 1 TABLET BY MOUTH DAILY 90 Tab 3    lisinopril-hydroCHLOROthiazide (PRINZIDE, ZESTORETIC) 20-12.5 mg per tablet TAKE 1 TABLET BY MOUTH DAILY 90 Tab 3    amLODIPine (NORVASC) 10 mg tablet Take 1 Tab by mouth daily. 90 Tab 3    metFORMIN ER (GLUCOPHAGE XR) 500 mg tablet TAKE 2 TABLETS BY MOUTH TWICE DAILY 360 Tab 3    gabapentin (NEURONTIN) 100 mg capsule Take 1 Cap by mouth three (3) times daily. 270 Cap 3    pravastatin (PRAVACHOL) 80 mg tablet Take 1 Tab by mouth nightly. 90 Tab 3    glucose blood VI test strips (TRUE METRIX GLUCOSE TEST STRIP) strip Use to test blood sugar once daily. Dx.e11.9 50 Strip 11    lancets misc Use to test blood sugar once daily. Dx.e11.9 100 Each 11    Blood-Glucose Meter monitoring kit Blood sugar checks daily 1 Kit 0    acetaminophen (TYLENOL EXTRA STRENGTH) 500 mg tablet Take 500 mg by mouth every six (6) hours as needed for Pain.  albuterol (PROVENTIL VENTOLIN) 2.5 mg /3 mL (0.083 %) nebulizer solution 3 mL by Nebulization route every four (4) hours as needed for Wheezing.  100 Each 11    dorzolamide (TRUSOPT) 2 % ophthalmic solution       NEXIUM 40 mg capsule       TRAVATAN Z 0.004 % ophthalmic solution       sitagliptin (JANUVIA) 100 mg tablet Take 100 mg by mouth daily. Past Medical History:   Diagnosis Date    Asthma     Diabetes ([facility] [address]     Glaucoma     Hypercholesterolemia     Hypertension      Past Surgical History:   Procedure Laterality Date    HX COLONOSCOPY      HX GYN      status post hysterectomy,BSO,and C-sections x3    HX ORTHOPAEDIC      Arthroscopic surgery for both knees     Allergies   Allergen Reactions    Percocet [Oxycodone-Acetaminophen] Rash and Itching         REVIEW OF SYSTEMS:  General: negative for - chills or fever  ENT: negative for - headaches, nasal congestion or tinnitus  Respiratory: negative for - cough, hemoptysis, shortness of breath or wheezing  Cardiovascular : negative for - chest pain, edema, palpitations or shortness of breath  Gastrointestinal: negative for - abdominal pain, blood in stools, heartburn or nausea/vomiting  Genito-Urinary: no dysuria, trouble voiding, or hematuria  Musculoskeletal: negative for - gait disturbance, joint pain, joint stiffness or joint swelling  Neurological: no TIA or stroke symptoms  Hematologic: no bruises, no bleeding, no swollen glands  Integument: no lumps, mole changes, nail changes or rash  Endocrine: no malaise/lethargy or unexpected weight changes      Social History     Socioeconomic History    Marital status: SINGLE     Spouse name: Not on file    Number of children: Not on file    Years of education: Not on file    Highest education level: Not on file   Occupational History    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   Tobacco Use    Smoking status: Never Smoker    Smokeless tobacco: Never Used   Substance and Sexual Activity    Alcohol use:  Yes     Alcohol/week: 0.5 oz     Types: 1 Cans of beer per week    Drug use: No    Sexual activity: Not Currently     Family History   Problem Relation Age of Onset    Cancer Mother     Lung Disease Father     Cancer Brother     Anesth Problems Neg Hx        OBJECTIVE:    Visit Vitals  /82 (BP 1 Location: Left arm, BP Patient Position: Sitting)   Pulse 70   Temp 96.1 °F (35.6 °C) (Oral)   Resp 18   Ht 5' 3\" (1.6 m)   Wt 157 lb 12.8 oz (71.6 kg)   SpO2 98%   BMI 27.95 kg/m²     CONSTITUTIONAL: well , well nourished, appears age appropriate  EYES: perrla, eom intact  ENMT:moist mucous membranes, pharynx clear  NECK: supple. Thyroid normal  RESPIRATORY: Chest: clear to ascultation and percussion   CARDIOVASCULAR: Heart: regular rate and rhythm  GASTROINTESTINAL: Abdomen: soft, bowel sounds active  HEMATOLOGIC: no pathological lymph nodes palpated  MUSCULOSKELETAL: Extremities: no edema, pulse 1+   INTEGUMENT: No unusual rashes or suspicious skin lesions noted. Nails appear normal.  NEUROLOGIC: non-focal exam   MENTAL STATUS: alert and oriented, appropriate affect      ASSESSMENT:  1. Type 2 diabetes mellitus without complication, without long-term current use of insulin ([address]    2. Essential hypertension    3. Dyslipidemia    4. Overweight (BMI 25.0-29. 9)        PLAN:    1. Her diabetes is doing quite well. I will await the results of her hemoglobin A1c.  2. She is normotensive today and will continue her antihypertensive medication as prescribed. 3. She will also continue her statin in view of the increased cardiovascular risk created by her existing comorbidities and age. 4. I encouraged her to continue weight reduction. This can be accomplished by eating meals, eliminating snacks and avoiding the consumption of processed carbohydrates. .  Orders Placed This Encounter    HEMOGLOBIN A1C WITH EAG    APOLIPOPROTEIN B    CBC WITH AUTOMATED DIFF    LIPID PANEL    METABOLIC PANEL, COMPREHENSIVE    TSH 3RD GENERATION    URINALYSIS W/ RFLX MICROSCOPIC    potassium chloride (K-DUR, KLOR-CON) 20 mEq tablet    lisinopril-hydroCHLOROthiazide (PRINZIDE, ZESTORETIC) 20-12.5 mg per tablet    amLODIPine (NORVASC) 10 mg tablet         Follow-up and Dispositions    · Return in about 3 months (around 7/24/2019).            Angelina Soto Chencho Carver MD

## 2019-08-07 ENCOUNTER — OFFICE VISIT (OUTPATIENT)
Dept: INTERNAL MEDICINE CLINIC | Age: 69
End: 2019-08-07

## 2019-08-07 VITALS
WEIGHT: 153.8 LBS | BODY MASS INDEX: 27.25 KG/M2 | TEMPERATURE: 97.5 F | SYSTOLIC BLOOD PRESSURE: 143 MMHG | HEIGHT: 63 IN | DIASTOLIC BLOOD PRESSURE: 84 MMHG | HEART RATE: 64 BPM | RESPIRATION RATE: 16 BRPM | OXYGEN SATURATION: 98 %

## 2019-08-07 DIAGNOSIS — G62.9 PERIPHERAL POLYNEUROPATHY: ICD-10-CM

## 2019-08-07 DIAGNOSIS — M54.50 ACUTE MIDLINE LOW BACK PAIN WITHOUT SCIATICA: ICD-10-CM

## 2019-08-07 DIAGNOSIS — E11.9 TYPE 2 DIABETES MELLITUS WITHOUT COMPLICATION, WITHOUT LONG-TERM CURRENT USE OF INSULIN (HCC): ICD-10-CM

## 2019-08-07 DIAGNOSIS — S06.2X0A: Primary | ICD-10-CM

## 2019-08-07 DIAGNOSIS — E78.5 DYSLIPIDEMIA: ICD-10-CM

## 2019-08-07 DIAGNOSIS — I10 ESSENTIAL HYPERTENSION: ICD-10-CM

## 2019-08-07 DIAGNOSIS — E66.3 OVERWEIGHT (BMI 25.0-29.9): ICD-10-CM

## 2019-08-07 RX ORDER — LISINOPRIL AND HYDROCHLOROTHIAZIDE 12.5; 2 MG/1; MG/1
TABLET ORAL
Qty: 90 TAB | Refills: 3 | Status: SHIPPED | OUTPATIENT
Start: 2019-08-07

## 2019-08-07 RX ORDER — AMLODIPINE BESYLATE 10 MG/1
10 TABLET ORAL DAILY
Qty: 90 TAB | Refills: 3 | Status: SHIPPED | OUTPATIENT
Start: 2019-08-07

## 2019-08-07 RX ORDER — GABAPENTIN 100 MG/1
100 CAPSULE ORAL 3 TIMES DAILY
Qty: 270 CAP | Refills: 3 | Status: SHIPPED | OUTPATIENT
Start: 2019-08-07 | End: 2020-03-25 | Stop reason: SDUPTHER

## 2019-08-07 NOTE — PROGRESS NOTES
Chief Complaint   Patient presents with    Diabetes     3 month follow up      1. Have you been to the ER, urgent care clinic since your last visit? Hospitalized since your last visit? No    2. Have you seen or consulted any other health care providers outside of the [address] [place] [address] since your last visit? Include any pap smears or colon screening. No     Patient states that she hit the top of her head on a mounted tv stand and cut her really bad. She states that she did not go to the emergency room, but, she was checked out by the EMTs. Patient states that she has been having headaches in the mornings upon waking.

## 2019-08-07 NOTE — PROGRESS NOTES
[address] [facility]  [place] [address]  [address]  [place] [address]  Phone:  [phone]  Fax: [phone]    Chief Complaint   Patient presents with    Diabetes     3 month follow up        SUBJECTIVE:    Urvashi Tellez is a 70 y.o. female comes in for return visit, stating that she has done reasonably well. She inadvertently struck her head while at home, sustaining a small laceration of the vertex. It was bleeding moderately, but it stopped fairly rapidly. Since then she has done well with no untoward effects. She continues to lose weight which is great. She has a past history of primary hypertension, dyslipidemia as well as diabetes mellitus. She is quite physically active and is in no way limited. Current Outpatient Medications   Medication Sig Dispense Refill    amLODIPine (NORVASC) 10 mg tablet Take 1 Tab by mouth daily. 90 Tab 3    gabapentin (NEURONTIN) 100 mg capsule Take 1 Cap by mouth three (3) times daily. 270 Cap 3    lisinopril-hydroCHLOROthiazide (PRINZIDE, ZESTORETIC) 20-12.5 mg per tablet TAKE 1 TABLET BY MOUTH DAILY 90 Tab 3    potassium chloride (K-DUR, KLOR-CON) 20 mEq tablet TAKE 1 TABLET BY MOUTH DAILY 90 Tab 3    metFORMIN ER (GLUCOPHAGE XR) 500 mg tablet TAKE 2 TABLETS BY MOUTH TWICE DAILY 360 Tab 3    pravastatin (PRAVACHOL) 80 mg tablet Take 1 Tab by mouth nightly. 90 Tab 3    glucose blood VI test strips (TRUE METRIX GLUCOSE TEST STRIP) strip Use to test blood sugar once daily. Dx.e11.9 50 Strip 11    lancets misc Use to test blood sugar once daily. Dx.e11.9 100 Each 11    Blood-Glucose Meter monitoring kit Blood sugar checks daily 1 Kit 0    acetaminophen (TYLENOL EXTRA STRENGTH) 500 mg tablet Take 500 mg by mouth every six (6) hours as needed for Pain.  albuterol (PROVENTIL VENTOLIN) 2.5 mg /3 mL (0.083 %) nebulizer solution 3 mL by Nebulization route every four (4) hours as needed for Wheezing.  100 Each 11    dorzolamide (TRUSOPT) 2 % ophthalmic solution       NEXIUM 40 mg capsule       TRAVATAN Z 0.004 % ophthalmic solution       sitagliptin (JANUVIA) 100 mg tablet Take 100 mg by mouth daily. Past Medical History:   Diagnosis Date    Asthma     Diabetes ([facility] [address]     Glaucoma     Hypercholesterolemia     Hypertension      Past Surgical History:   Procedure Laterality Date    HX COLONOSCOPY      HX GYN      status post hysterectomy,BSO,and C-sections x3    HX ORTHOPAEDIC      Arthroscopic surgery for both knees     Allergies   Allergen Reactions    Percocet [Oxycodone-Acetaminophen] Rash and Itching       REVIEW OF SYSTEMS:  General: negative for - chills or fever  ENT: negative for - headaches, nasal congestion or tinnitus  Respiratory: negative for - cough, hemoptysis, shortness of breath or wheezing  Cardiovascular : negative for - chest pain, edema, palpitations or shortness of breath  Gastrointestinal: negative for - abdominal pain, blood in stools, heartburn or nausea/vomiting  Genito-Urinary: no dysuria, trouble voiding, or hematuria  Musculoskeletal: negative for - gait disturbance, joint pain, joint stiffness or joint swelling  Neurological: no TIA or stroke symptoms  Hematologic: no bruises, no bleeding, no swollen glands  Integument: no lumps, mole changes, nail changes or rash  Endocrine:no malaise/lethargy or unexpected weight changes      Social History     Socioeconomic History    Marital status: SINGLE     Spouse name: Not on file    Number of children: Not on file    Years of education: Not on file    Highest education level: Not on file   Occupational History    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   Tobacco Use    Smoking status: Never Smoker    Smokeless tobacco: Never Used   Substance and Sexual Activity    Alcohol use:  Yes     Alcohol/week: 0.8 standard drinks     Types: 1 Cans of beer per week    Drug use: No    Sexual activity: Not Currently     Family History Problem Relation Age of Onset    Cancer Mother     Lung Disease Father     Cancer Brother     Anesth Problems Neg Hx        OBJECTIVE:     Visit Vitals  /84   Pulse 64   Temp 97.5 °F (36.4 °C) (Oral)   Resp 16   Ht 5' 3\" (1.6 m)   Wt 153 lb 12.8 oz (69.8 kg)   SpO2 98%   BMI 27.24 kg/m²     CONSTITUTIONAL: well , well nourished, appears age appropriate  EYES: perrla, eom intact  ENMT:moist mucous membranes, pharynx clear  NECK: supple. Thyroid normal  RESPIRATORY: Chest: clear to ascultation and percussion   CARDIOVASCULAR: Heart: regular rate and rhythm  GASTROINTESTINAL: Abdomen: soft, bowel sounds active  HEMATOLOGIC: no pathological lymph nodes palpated  MUSCULOSKELETAL: Extremities: no edema, pulse 1+   INTEGUMENT: No unusual rashes or suspicious skin lesions noted. Nails appear normal.  NEUROLOGIC: non-focal exam   MENTAL STATUS: alert and oriented, appropriate affect     ASSESSMENT:   1. Laceration and contusion of cerebral cortex without loss of consciousness, unspecified laterality, initial encounter ([facility] [address]    2. Type 2 diabetes mellitus without complication, without long-term current use of insulin ([facility] [address]    3. Essential hypertension    4. Dyslipidemia    5. Overweight (BMI 25.0-29.9)    6. Acute midline low back pain without sciatica    7. Peripheral polyneuropathy        PLAN:  1. The laceration is healing nicely. There is no evidence of any secondary infection. It is on the vertex on a region just to the right of the midline. 2. Diabetes historically has been doing well but I will await the results of her hemoglobin A1c.  3. Blood pressure is excellent. No adjustments were made. 4. She will continue statin in view of increased cardiovascular risk. 5. I encourage her to minimize weight gain as is happening. She is losing weight nicely. I emphasized the importance of eating meals, eliminating snacks, and avoiding the consumption of processed carbohydrates.   6. She does have numbness and tingling in the distal lower extremities which is quite responsive to gabapentin. 7. Low back pain has been stable for now. This presumably is secondary to lumbar spondylosis. .  Orders Placed This Encounter    MICROALBUMIN, UR, RAND    HEMOGLOBIN A1C WITH EAG    METABOLIC PANEL, BASIC    APOLIPOPROTEIN B    amLODIPine (NORVASC) 10 mg tablet    gabapentin (NEURONTIN) 100 mg capsule    lisinopril-hydroCHLOROthiazide (PRINZIDE, ZESTORETIC) 20-12.5 mg per tablet         ATTENTION:   This medical record was transcribed using an electronic medical records system. Although proofread, it may and can contain electronic and spelling errors. Other human spelling and other errors may be present. Corrections may be executed at a later time. Please feel free to contact us for any clarifications as needed. Follow-up and Dispositions    · Return in about 3 months (around 11/7/2019).            Kalin Ruth MD

## 2019-08-08 LAB
ALBUMIN/CREAT UR: 35.4 MG/G CREAT (ref 0–30)
APO B SERPL-MCNC: 53 MG/DL
BUN SERPL-MCNC: 10 MG/DL (ref 8–27)
BUN/CREAT SERPL: 14 (ref 12–28)
CALCIUM SERPL-MCNC: 10.2 MG/DL (ref 8.7–10.3)
CHLORIDE SERPL-SCNC: 105 MMOL/L (ref 96–106)
CO2 SERPL-SCNC: 23 MMOL/L (ref 20–29)
CREAT SERPL-MCNC: 0.73 MG/DL (ref 0.57–1)
CREAT UR-MCNC: 94.6 MG/DL
EST. AVERAGE GLUCOSE BLD GHB EST-MCNC: 134 MG/DL
GLUCOSE SERPL-MCNC: 85 MG/DL (ref 65–99)
HBA1C MFR BLD: 6.3 % (ref 4.8–5.6)
MICROALBUMIN UR-MCNC: 33.5 UG/ML
POTASSIUM SERPL-SCNC: 4.1 MMOL/L (ref 3.5–5.2)
SODIUM SERPL-SCNC: 144 MMOL/L (ref 134–144)

## 2019-11-20 ENCOUNTER — OFFICE VISIT (OUTPATIENT)
Dept: INTERNAL MEDICINE CLINIC | Age: 69
End: 2019-11-20

## 2019-11-20 VITALS
HEIGHT: 63 IN | OXYGEN SATURATION: 99 % | HEART RATE: 63 BPM | DIASTOLIC BLOOD PRESSURE: 83 MMHG | SYSTOLIC BLOOD PRESSURE: 135 MMHG | BODY MASS INDEX: 27.43 KG/M2 | WEIGHT: 154.8 LBS | TEMPERATURE: 97.1 F | RESPIRATION RATE: 16 BRPM

## 2019-11-20 DIAGNOSIS — J45.30 MILD PERSISTENT REACTIVE AIRWAY DISEASE WITHOUT COMPLICATION: ICD-10-CM

## 2019-11-20 DIAGNOSIS — Z12.31 SCREENING MAMMOGRAM, ENCOUNTER FOR: ICD-10-CM

## 2019-11-20 DIAGNOSIS — I10 ESSENTIAL HYPERTENSION: Primary | ICD-10-CM

## 2019-11-20 DIAGNOSIS — E11.9 TYPE 2 DIABETES MELLITUS WITHOUT COMPLICATION, WITHOUT LONG-TERM CURRENT USE OF INSULIN (HCC): ICD-10-CM

## 2019-11-20 DIAGNOSIS — E78.5 DYSLIPIDEMIA: ICD-10-CM

## 2019-11-20 DIAGNOSIS — Z00.00 WELLNESS EXAMINATION: ICD-10-CM

## 2019-11-20 DIAGNOSIS — E66.3 OVERWEIGHT (BMI 25.0-29.9): ICD-10-CM

## 2019-11-20 DIAGNOSIS — Z13.31 SCREENING FOR DEPRESSION: ICD-10-CM

## 2019-11-20 DIAGNOSIS — Z13.39 SCREENING FOR ALCOHOLISM: ICD-10-CM

## 2019-11-20 PROBLEM — E66.9 OBESITY (BMI 30.0-34.9): Status: RESOLVED | Noted: 2018-03-24 | Resolved: 2019-11-20

## 2019-11-20 NOTE — PROGRESS NOTES
Chief Complaint   Patient presents with   [address] [place] [address] Annual Wellness Visit     1. Have you been to the ER, urgent care clinic since your last visit? Hospitalized since your last visit? No    2. Have you seen or consulted any other health care providers outside of the [address] [place] [address] since your last visit? Include any pap smears or colon screening.  No

## 2019-11-20 NOTE — PROGRESS NOTES
[address] [facility]  [place]   [address] [place] [address]  Phone:  [phone]  Fax: [phone]       Chief Complaint   Patient presents with   [facility] Wellness Visit   . SUBJECTIVE:    Junior Mohr is a 70 y.o. female Comes in for return visit stating that she has done well. She needs a refill on her asthma medication because she wheezes periodically, particularly during the fall and winter months. She is maintaining her weight nicely. She has a past history of primary hypertension, dyslipidemia and diabetes mellitus. She is quite physically active taking care of her grandchildren. Current Outpatient Medications   Medication Sig Dispense Refill    amLODIPine (NORVASC) 10 mg tablet Take 1 Tab by mouth daily. 90 Tab 3    gabapentin (NEURONTIN) 100 mg capsule Take 1 Cap by mouth three (3) times daily. 270 Cap 3    lisinopril-hydroCHLOROthiazide (PRINZIDE, ZESTORETIC) 20-12.5 mg per tablet TAKE 1 TABLET BY MOUTH DAILY 90 Tab 3    potassium chloride (K-DUR, KLOR-CON) 20 mEq tablet TAKE 1 TABLET BY MOUTH DAILY 90 Tab 3    metFORMIN ER (GLUCOPHAGE XR) 500 mg tablet TAKE 2 TABLETS BY MOUTH TWICE DAILY 360 Tab 3    pravastatin (PRAVACHOL) 80 mg tablet Take 1 Tab by mouth nightly. 90 Tab 3    glucose blood VI test strips (TRUE METRIX GLUCOSE TEST STRIP) strip Use to test blood sugar once daily. Dx.e11.9 50 Strip 11    lancets misc Use to test blood sugar once daily. Dx.e11.9 100 Each 11    Blood-Glucose Meter monitoring kit Blood sugar checks daily 1 Kit 0    acetaminophen (TYLENOL EXTRA STRENGTH) 500 mg tablet Take 500 mg by mouth every six (6) hours as needed for Pain.  albuterol (PROVENTIL VENTOLIN) 2.5 mg /3 mL (0.083 %) nebulizer solution 3 mL by Nebulization route every four (4) hours as needed for Wheezing. 100 Each 11    sitagliptin (JANUVIA) 100 mg tablet Take 100 mg by mouth daily.       dorzolamide (TRUSOPT) 2 % ophthalmic solution       NEXIUM 40 mg capsule       TRAVATAN Z 0.004 % ophthalmic solution        Past Medical History:   Diagnosis Date    Asthma     Diabetes ([facility] [address]     Glaucoma     Hypercholesterolemia     Hypertension      Past Surgical History:   Procedure Laterality Date    HX COLONOSCOPY      HX GYN      status post hysterectomy,BSO,and C-sections x3    HX ORTHOPAEDIC      Arthroscopic surgery for both knees     Allergies   Allergen Reactions    Percocet [Oxycodone-Acetaminophen] Rash and Itching         REVIEW OF SYSTEMS:  General: negative for - chills or fever  ENT: negative for - headaches, nasal congestion or tinnitus  Respiratory: negative for - cough, hemoptysis, shortness of breath or wheezing  Cardiovascular : negative for - chest pain, edema, palpitations or shortness of breath  Gastrointestinal: negative for - abdominal pain, blood in stools, heartburn or nausea/vomiting  Genito-Urinary: no dysuria, trouble voiding, or hematuria  Musculoskeletal: negative for - gait disturbance, joint pain, joint stiffness or joint swelling  Neurological: no TIA or stroke symptoms  Hematologic: no bruises, no bleeding, no swollen glands  Integument: no lumps, mole changes, nail changes or rash  Endocrine: no malaise/lethargy or unexpected weight changes      Social History     Socioeconomic History    Marital status: SINGLE     Spouse name: Not on file    Number of children: Not on file    Years of education: Not on file    Highest education level: Not on file   Occupational History    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   Tobacco Use    Smoking status: Never Smoker    Smokeless tobacco: Never Used   Substance and Sexual Activity    Alcohol use:  Yes     Alcohol/week: 0.8 standard drinks     Types: 1 Cans of beer per week    Drug use: No    Sexual activity: Not Currently     Family History   Problem Relation Age of Onset    Cancer Mother     Lung Disease Father     Cancer Brother    [facility] Anesth Problems Neg Hx        OBJECTIVE:    Visit Vitals  /83   Pulse 63   Temp 97.1 °F (36.2 °C) (Oral)   Resp 16   Ht 5' 3\" (1.6 m)   Wt 154 lb 12.8 oz (70.2 kg)   SpO2 99%   BMI 27.42 kg/m²     CONSTITUTIONAL: well , well nourished, appears age appropriate  EYES: perrla, eom intact  ENMT:moist mucous membranes, pharynx clear  NECK: supple. Thyroid normal  RESPIRATORY: Chest: clear to ascultation and percussion   CARDIOVASCULAR: Heart: regular rate and rhythm  GASTROINTESTINAL: Abdomen: soft, bowel sounds active  HEMATOLOGIC: no pathological lymph nodes palpated  MUSCULOSKELETAL: Extremities: no edema, pulse 1+   INTEGUMENT: No unusual rashes or suspicious skin lesions noted. Nails appear normal.  NEUROLOGIC: non-focal exam   MENTAL STATUS: alert and oriented, appropriate affect      ASSESSMENT:  1. Essential hypertension    2. Type 2 diabetes mellitus without complication, without long-term current use of insulin ([address]    3. Mild persistent reactive airway disease without complication    4. Overweight (BMI 25.0-29.9)    5. Dyslipidemia    6. Screening mammogram, encounter for    7. Screening for alcoholism    8. Screening for depression    9. Wellness examination        PLAN:    1. Her blood pressure is excellent today, no adjustments are made. 2. From a diabetic perspective, I will await the results of her hemoglobin A1c. Historically she has been euglycemic. 3. For reactive airway disease I will continue rescue inhaler. If the frequency of this increases, she will have to use a maintenance inhaler also. 4. I encouraged weight reduction. She is overweight, but not obese. This can be accomplished by eating meals, eliminating snacks and avoiding the consumption of processed carbohydrates. 5. She will continue statin in view of her primary cardiovascular risk prevention status. She has had no adverse effects of the medication.     .  Orders Placed This Encounter    Depression Screen Annual    ANGELA MAMMO BI SCREENING INCL CAD    METABOLIC PANEL, BASIC    HEMOGLOBIN A1C WITH EAG    APOLIPOPROTEIN B         Follow-up and Dispositions    · Return in about 3 months (around 2/20/2020). Joan Padilla MD  This is the Subsequent Medicare Annual Wellness Exam, performed 12 months or more after the Initial AWV or the last Subsequent AWV    I have reviewed the patient's medical history in detail and updated the computerized patient record. History     Patient Active Problem List   Diagnosis Code    Hypertension I10    Diabetes mellitus ([facility] [address] E11.9    Dyslipidemia E78.5    Osteoarthritis M19.90    Reactive airway disease J45.909    Rhinitis J31.0    Acute midline low back pain without sciatica M54.5    Right bundle branch block I45.10    Overweight (BMI 25.0-29. 9) E66.3     Past Medical History:   Diagnosis Date    Asthma     Diabetes ([facility] [address]     Glaucoma     Hypercholesterolemia     Hypertension       Past Surgical History:   Procedure Laterality Date    HX COLONOSCOPY      HX GYN      status post hysterectomy,BSO,and C-sections x3    HX ORTHOPAEDIC      Arthroscopic surgery for both knees     Current Outpatient Medications   Medication Sig Dispense Refill    amLODIPine (NORVASC) 10 mg tablet Take 1 Tab by mouth daily. 90 Tab 3    gabapentin (NEURONTIN) 100 mg capsule Take 1 Cap by mouth three (3) times daily. 270 Cap 3    lisinopril-hydroCHLOROthiazide (PRINZIDE, ZESTORETIC) 20-12.5 mg per tablet TAKE 1 TABLET BY MOUTH DAILY 90 Tab 3    potassium chloride (K-DUR, KLOR-CON) 20 mEq tablet TAKE 1 TABLET BY MOUTH DAILY 90 Tab 3    metFORMIN ER (GLUCOPHAGE XR) 500 mg tablet TAKE 2 TABLETS BY MOUTH TWICE DAILY 360 Tab 3    pravastatin (PRAVACHOL) 80 mg tablet Take 1 Tab by mouth nightly. 90 Tab 3    glucose blood VI test strips (TRUE METRIX GLUCOSE TEST STRIP) strip Use to test blood sugar once daily. Dx.e11.9 50 Strip 11    lancets misc Use to test blood sugar once daily.  Dx.e11.9 100 Each 11    Blood-Glucose Meter monitoring kit Blood sugar checks daily 1 Kit 0    acetaminophen (TYLENOL EXTRA STRENGTH) 500 mg tablet Take 500 mg by mouth every six (6) hours as needed for Pain.  albuterol (PROVENTIL VENTOLIN) 2.5 mg /3 mL (0.083 %) nebulizer solution 3 mL by Nebulization route every four (4) hours as needed for Wheezing. 100 Each 11    sitagliptin (JANUVIA) 100 mg tablet Take 100 mg by mouth daily.  dorzolamide (TRUSOPT) 2 % ophthalmic solution       NEXIUM 40 mg capsule       TRAVATAN Z 0.004 % ophthalmic solution        Allergies   Allergen Reactions    Percocet [Oxycodone-Acetaminophen] Rash and Itching       Family History   Problem Relation Age of Onset    Cancer Mother     Lung Disease Father     Cancer Brother     Anesth Problems Neg Hx      Social History     Tobacco Use    Smoking status: Never Smoker    Smokeless tobacco: Never Used   Substance Use Topics    Alcohol use: Yes     Alcohol/week: 0.8 standard drinks     Types: 1 Cans of beer per week       Depression Risk Factor Screening:     3 most recent PHQ Screens 11/20/2019   PHQ Not Done -   Little interest or pleasure in doing things Not at all   Feeling down, depressed, irritable, or hopeless Not at all   Total Score PHQ 2 0       Alcohol Risk Factor Screening:   Do you average 1 drink per night or more than 7 drinks a week:  No    On any one occasion in the past three months have you have had more than 3 drinks containing alcohol:  No      Functional Ability and Level of Safety:   Hearing: Hearing is good. Activities of Daily Living: The home contains: no safety equipment. Patient does total self care    Ambulation: with no difficulty    Fall Risk:  Fall Risk Assessment, last 12 mths 11/20/2019   Able to walk? Yes   Fall in past 12 months?  No   Fall with injury? -   Number of falls in past 12 months -   Fall Risk Score -       Abuse Screen:  Patient is not abused    Cognitive Screening   Has your family/caregiver stated any concerns about your memory: no  Cognitive Screening: Normal - Not necessary    Patient Care Team   Patient Care Team:  Samia Sung MD as PCP - General (Internal Medicine)  Samia Sung MD as PCP - [facility] Empaneled Provider    Assessment/Plan   Education and counseling provided:  Are appropriate based on today's review and evaluation    Diagnoses and all orders for this visit:    1. Essential hypertension  -     METABOLIC PANEL, BASIC    2. Type 2 diabetes mellitus without complication, without long-term current use of insulin (HCC)  -     HEMOGLOBIN A1C WITH EAG    3. Mild persistent reactive airway disease without complication    4. Overweight (BMI 25.0-29.9)    5. Dyslipidemia  -     APOLIPOPROTEIN B    6. Screening mammogram, encounter for  -     ANGELA MAMMO BI SCREENING INCL CAD; Future    7. Screening for alcoholism  -     OK ANNUAL ALCOHOL SCREEN 15 MIN    8. Screening for depression  -     DEPRESSION SCREEN ANNUAL    9.  Wellness examination        Health Maintenance Due   Topic Date Due    FOOT EXAM Q1  08/28/2018    FOBT Q 1 YEAR AGE 50-75  03/23/2019    Influenza Age 5 to Adult  08/01/2019    BREAST CANCER SCRN MAMMOGRAM  08/28/2019

## 2019-11-21 LAB
APO B SERPL-MCNC: 70 MG/DL
BUN SERPL-MCNC: 15 MG/DL (ref 8–27)
BUN/CREAT SERPL: 20 (ref 12–28)
CALCIUM SERPL-MCNC: 10.4 MG/DL (ref 8.7–10.3)
CHLORIDE SERPL-SCNC: 104 MMOL/L (ref 96–106)
CO2 SERPL-SCNC: 24 MMOL/L (ref 20–29)
CREAT SERPL-MCNC: 0.75 MG/DL (ref 0.57–1)
EST. AVERAGE GLUCOSE BLD GHB EST-MCNC: 134 MG/DL
GLUCOSE SERPL-MCNC: 110 MG/DL (ref 65–99)
HBA1C MFR BLD: 6.3 % (ref 4.8–5.6)
POTASSIUM SERPL-SCNC: 3.7 MMOL/L (ref 3.5–5.2)
SODIUM SERPL-SCNC: 145 MMOL/L (ref 134–144)

## 2019-11-21 NOTE — PATIENT INSTRUCTIONS
Medicare Wellness Visit, Female The best way to live healthy is to have a lifestyle where you eat a well-balanced diet, exercise regularly, limit alcohol use, and quit all forms of tobacco/nicotine, if applicable. Regular preventive services are another way to keep healthy. Preventive services (vaccines, screening tests, monitoring & exams) can help personalize your care plan, which helps you manage your own care. Screening tests can find health problems at the earliest stages, when they are easiest to treat. [facility] follows the current, evidence-based guidelines published by the [facility] Abhilash Delatorre ([facility]) when recommending preventive services for our patients. Because we follow these guidelines, sometimes recommendations change over time as research supports it. (For example, mammograms used to be recommended annually. Even though Medicare will still pay for an annual mammogram, the newer guidelines recommend a mammogram every two years for women of average risk). Of course, you and your doctor may decide to screen more often for some diseases, based on your risk and your co-morbidities (chronic disease you are already diagnosed with). Preventive services for you include: - Medicare offers their members a free annual wellness visit, which is time for you and your primary care provider to discuss and plan for your preventive service needs. Take advantage of this benefit every year! 
-All adults over the age of 72 should receive the recommended pneumonia vaccines. Current USPSTF guidelines recommend a series of two vaccines for the best pneumonia protection.  
-All adults should have a flu vaccine yearly and a tetanus vaccine every 10 years.  
-All adults age 48 and older should receive the shingles vaccines (series of two vaccines). -All adults age 38-68 who are overweight should have a diabetes screening test once every three years. -All adults born between [birth year] and 1966 should be screened once for Hepatitis C. 
-Other screening tests and preventive services for persons with diabetes include: an eye exam to screen for diabetic retinopathy, a kidney function test, a foot exam, and stricter control over your cholesterol.  
-Cardiovascular screening for adults with routine risk involves an electrocardiogram (ECG) at intervals determined by your doctor.  
-Colorectal cancer screenings should be done for adults age 54-65 with no increased risk factors for colorectal cancer. There are a number of acceptable methods of screening for this type of cancer. Each test has its own benefits and drawbacks. Discuss with your doctor what is most appropriate for you during your annual wellness visit. The different tests include: colonoscopy (considered the best screening method), a fecal occult blood test, a fecal DNA test, and sigmoidoscopy. 
 
-A bone mass density test is recommended when a woman turns 65 to screen for osteoporosis. This test is only recommended one time, as a screening. Some providers will use this same test as a disease monitoring tool if you already have osteoporosis. -Breast cancer screenings are recommended every other year for women of normal risk, age 54-69. 
-Cervical cancer screenings for women over age 72 are only recommended with certain risk factors. Here is a list of your current Health Maintenance items (your personalized list of preventive services) with a due date: 
Health Maintenance Due Topic Date Due  
 Diabetic Foot Care  08/28/2018  Stool testing for trace blood  03/23/2019  Flu Vaccine  08/01/2019  Mammogram  08/28/2019

## 2019-12-02 ENCOUNTER — HOSPITAL ENCOUNTER (OUTPATIENT)
Dept: MAMMOGRAPHY | Age: 69
Discharge: HOME | End: 2019-12-02
Payer: MEDICARE

## 2019-12-02 DIAGNOSIS — Z12.31 VISIT FOR SCREENING MAMMOGRAM: ICD-10-CM

## 2019-12-02 PROCEDURE — 77067 SCR MAMMO BI INCL CAD: CPT

## 2020-03-25 DIAGNOSIS — G62.9 PERIPHERAL POLYNEUROPATHY: ICD-10-CM

## 2020-03-25 RX ORDER — ALBUTEROL SULFATE 90 UG/1
2 AEROSOL, METERED RESPIRATORY (INHALATION)
Qty: 1 INHALER | Refills: 11 | Status: SHIPPED | OUTPATIENT
Start: 2020-03-25

## 2020-03-25 RX ORDER — GABAPENTIN 100 MG/1
100 CAPSULE ORAL 3 TIMES DAILY
Qty: 270 CAP | Refills: 3 | Status: SHIPPED | OUTPATIENT
Start: 2020-03-25 | End: 2021-01-04

## 2020-06-01 RX ORDER — LANCETS
EACH MISCELLANEOUS
Qty: 100 EACH | Refills: 11 | Status: SHIPPED | OUTPATIENT
Start: 2020-06-01

## 2020-06-14 DIAGNOSIS — I10 ESSENTIAL HYPERTENSION: ICD-10-CM

## 2020-06-14 RX ORDER — POTASSIUM CHLORIDE 20 MEQ/1
TABLET, EXTENDED RELEASE ORAL
Qty: 90 TAB | Refills: 3 | Status: SHIPPED | OUTPATIENT
Start: 2020-06-14

## 2022-03-20 PROBLEM — I45.10 RIGHT BUNDLE BRANCH BLOCK: Status: ACTIVE | Noted: 2018-03-24

## 2022-03-20 PROBLEM — E66.3 OVERWEIGHT (BMI 25.0-29.9): Status: ACTIVE | Noted: 2018-10-03

## 2022-06-01 ENCOUNTER — ANESTHESIA (OUTPATIENT)
Dept: ENDOSCOPY | Age: 72
End: 2022-06-01
Payer: MEDICARE

## 2022-06-01 ENCOUNTER — ANESTHESIA EVENT (OUTPATIENT)
Dept: ENDOSCOPY | Age: 72
End: 2022-06-01
Payer: MEDICARE

## 2022-06-01 ENCOUNTER — HOSPITAL ENCOUNTER (OUTPATIENT)
Age: 72
Setting detail: OUTPATIENT SURGERY
Discharge: HOME | End: 2022-06-01
Attending: INTERNAL MEDICINE | Admitting: INTERNAL MEDICINE
Payer: MEDICARE

## 2022-06-01 VITALS
HEART RATE: 70 BPM | RESPIRATION RATE: 17 BRPM | DIASTOLIC BLOOD PRESSURE: 68 MMHG | OXYGEN SATURATION: 97 % | TEMPERATURE: 97.4 F | SYSTOLIC BLOOD PRESSURE: 113 MMHG | WEIGHT: 159 LBS | HEIGHT: 62 IN | BODY MASS INDEX: 29.26 KG/M2

## 2022-06-01 PROCEDURE — 74011000250 HC RX REV CODE- 250: Performed by: NURSE ANESTHETIST, CERTIFIED REGISTERED

## 2022-06-01 PROCEDURE — 76060000031 HC ANESTHESIA FIRST 0.5 HR: Performed by: INTERNAL MEDICINE

## 2022-06-01 PROCEDURE — 74011250637 HC RX REV CODE- 250/637: Performed by: INTERNAL MEDICINE

## 2022-06-01 PROCEDURE — 74011250636 HC RX REV CODE- 250/636: Performed by: NURSE ANESTHETIST, CERTIFIED REGISTERED

## 2022-06-01 PROCEDURE — 76040000019: Performed by: INTERNAL MEDICINE

## 2022-06-01 RX ORDER — LIDOCAINE HYDROCHLORIDE 20 MG/ML
INJECTION, SOLUTION EPIDURAL; INFILTRATION; INTRACAUDAL; PERINEURAL AS NEEDED
Status: DISCONTINUED | OUTPATIENT
Start: 2022-06-01 | End: 2022-06-01 | Stop reason: HOSPADM

## 2022-06-01 RX ORDER — FENTANYL CITRATE 50 UG/ML
25-200 INJECTION, SOLUTION INTRAMUSCULAR; INTRAVENOUS
Status: DISCONTINUED | OUTPATIENT
Start: 2022-06-01 | End: 2022-06-01 | Stop reason: HOSPADM

## 2022-06-01 RX ORDER — EPINEPHRINE 0.1 MG/ML
1 INJECTION INTRACARDIAC; INTRAVENOUS
Status: DISCONTINUED | OUTPATIENT
Start: 2022-06-01 | End: 2022-06-01 | Stop reason: HOSPADM

## 2022-06-01 RX ORDER — ATROPINE SULFATE 0.1 MG/ML
0.5 INJECTION INTRAVENOUS
Status: DISCONTINUED | OUTPATIENT
Start: 2022-06-01 | End: 2022-06-01 | Stop reason: HOSPADM

## 2022-06-01 RX ORDER — SODIUM CHLORIDE 9 MG/ML
INJECTION, SOLUTION INTRAVENOUS
Status: DISCONTINUED | OUTPATIENT
Start: 2022-06-01 | End: 2022-06-01 | Stop reason: HOSPADM

## 2022-06-01 RX ORDER — PROPOFOL 10 MG/ML
INJECTION, EMULSION INTRAVENOUS AS NEEDED
Status: DISCONTINUED | OUTPATIENT
Start: 2022-06-01 | End: 2022-06-01 | Stop reason: HOSPADM

## 2022-06-01 RX ORDER — SODIUM CHLORIDE 0.9 % (FLUSH) 0.9 %
5-40 SYRINGE (ML) INJECTION AS NEEDED
Status: DISCONTINUED | OUTPATIENT
Start: 2022-06-01 | End: 2022-06-01 | Stop reason: HOSPADM

## 2022-06-01 RX ORDER — SODIUM CHLORIDE 0.9 % (FLUSH) 0.9 %
5-40 SYRINGE (ML) INJECTION EVERY 8 HOURS
Status: DISCONTINUED | OUTPATIENT
Start: 2022-06-01 | End: 2022-06-01 | Stop reason: HOSPADM

## 2022-06-01 RX ORDER — MIDAZOLAM HYDROCHLORIDE 1 MG/ML
.25-5 INJECTION, SOLUTION INTRAMUSCULAR; INTRAVENOUS
Status: DISCONTINUED | OUTPATIENT
Start: 2022-06-01 | End: 2022-06-01 | Stop reason: HOSPADM

## 2022-06-01 RX ORDER — NALOXONE HYDROCHLORIDE 0.4 MG/ML
0.4 INJECTION, SOLUTION INTRAMUSCULAR; INTRAVENOUS; SUBCUTANEOUS
Status: DISCONTINUED | OUTPATIENT
Start: 2022-06-01 | End: 2022-06-01 | Stop reason: HOSPADM

## 2022-06-01 RX ORDER — FLUMAZENIL 0.1 MG/ML
0.2 INJECTION INTRAVENOUS
Status: DISCONTINUED | OUTPATIENT
Start: 2022-06-01 | End: 2022-06-01 | Stop reason: HOSPADM

## 2022-06-01 RX ORDER — SODIUM CHLORIDE 9 MG/ML
50 INJECTION, SOLUTION INTRAVENOUS CONTINUOUS
Status: DISCONTINUED | OUTPATIENT
Start: 2022-06-01 | End: 2022-06-01 | Stop reason: HOSPADM

## 2022-06-01 RX ORDER — DEXTROMETHORPHAN/PSEUDOEPHED 2.5-7.5/.8
1.2 DROPS ORAL
Status: DISCONTINUED | OUTPATIENT
Start: 2022-06-01 | End: 2022-06-01 | Stop reason: HOSPADM

## 2022-06-01 RX ADMIN — PROPOFOL 60 MG: 10 INJECTION, EMULSION INTRAVENOUS at 16:04

## 2022-06-01 RX ADMIN — PROPOFOL 20 MG: 10 INJECTION, EMULSION INTRAVENOUS at 16:08

## 2022-06-01 RX ADMIN — SODIUM CHLORIDE: 900 INJECTION, SOLUTION INTRAVENOUS at 15:55

## 2022-06-01 RX ADMIN — PROPOFOL 40 MG: 10 INJECTION, EMULSION INTRAVENOUS at 16:10

## 2022-06-01 RX ADMIN — PROPOFOL 30 MG: 10 INJECTION, EMULSION INTRAVENOUS at 16:12

## 2022-06-01 RX ADMIN — PROPOFOL 20 MG: 10 INJECTION, EMULSION INTRAVENOUS at 16:06

## 2022-06-01 RX ADMIN — PROPOFOL 30 MG: 10 INJECTION, EMULSION INTRAVENOUS at 16:14

## 2022-06-01 RX ADMIN — PROPOFOL 50 MG: 10 INJECTION, EMULSION INTRAVENOUS at 16:17

## 2022-06-01 RX ADMIN — LIDOCAINE HYDROCHLORIDE 40 MG: 20 INJECTION, SOLUTION EPIDURAL; INFILTRATION; INTRACAUDAL; PERINEURAL at 16:04

## 2022-06-01 NOTE — DISCHARGE INSTRUCTIONS
[facility]    COLON DISCHARGE INSTRUCTIONS    Zoila Landin  [id number]  1950    Discomfort:  Redness at IV site- apply warm compress to area; if redness or soreness persist- contact your physician  There may be a slight amount of blood passed from the rectum  Gaseous discomfort- walking, belching will help relieve any discomfort  You may not operate a vehicle for 12 hours  You may not engage in an occupation involving machinery or appliances for rest of today  You may not drink alcoholic beverages for at least 12 hours  Avoid making any critical decisions for at least 24 hour  DIET:  You may resume your regular diet - however -  remember your colon is empty and a heavy meal will produce gas. Avoid these foods:  vegetables, fried / greasy foods, carbonated drinks     ACTIVITY:  You may  resume your normal daily activities it is recommended that you spend the remainder of the day resting -  avoid any strenuous activity. CALL M.D. ANY SIGN OF:   Increasing pain, nausea, vomiting  Abdominal distension (swelling)  New increased bleeding (oral or rectal)  Fever (chills)  Pain in chest area  Bloody discharge from nose or mouth  Shortness of breath      Follow-up Instructions:   Call Dr. Hernando Lott for any questions or problems at [phone]   High fiber diet        ENDOSCOPY FINDINGS:   Your colonoscopy showed only mild diverticulosis, rest of exam was normal.  Telephone # [phone]      Signed By: Hernando Lott MD     6/1/2022  4:37 PM       DISCHARGE SUMMARY from Nurse    The following personal items collected during your admission are returned to you:   Dental Appliance: Dental Appliances: None  Vision: Visual Aid: Glasses  Hearing Aid:    Jewelry:    Clothing:    Other Valuables:    Valuables sent to safe:        Learning About Coronavirus (COVID-19)  Coronavirus (COVID-19): Overview  What is coronavirus (JHLNY-67)?   The coronavirus disease (COVID-19) is caused by a virus. It is an illness that was first found in [facility], in December 2019. It has since spread worldwide. The virus can cause fever, cough, and trouble breathing. In severe cases, it can cause pneumonia and make it hard to breathe without help. It can cause death. Coronaviruses are a large group of viruses. They cause the common cold. They also cause more serious illnesses like Middle East respiratory syndrome (MERS) and severe acute respiratory syndrome (SARS). COVID-19 is caused by a novel coronavirus. That means it's a new type that has not been seen in people before. This virus spreads person-to-person through droplets from coughing and sneezing. It can also spread when you are close to someone who is infected. And it can spread when you touch something that has the virus on it, such as a doorknob or a tabletop. What can you do to protect yourself from coronavirus (COVID-19)? The best way to protect yourself from getting sick is to:  · Avoid areas where there is an outbreak. · Avoid contact with people who may be infected. · Wash your hands often with soap or alcohol-based hand sanitizers. · Avoid crowds and try to stay at least 6 feet away from other people. · Wash your hands often, especially after you cough or sneeze. Use soap and water, and scrub for at least 20 seconds. If soap and water aren't available, use an alcohol-based hand . · Avoid touching your mouth, nose, and eyes. What can you do to avoid spreading the virus to others? To help avoid spreading the virus to others:  · Cover your mouth with a tissue when you cough or sneeze. Then throw the tissue in the trash. · Use a disinfectant to clean things that you touch often. · Stay home if you are sick or have been exposed to the virus. Don't go to school, work, or public areas. And don't use public transportation.   · If you are sick:  ? Leave your home only if you need to get medical care. But call the doctor's office first so they know you're coming. And wear a face mask, if you have one.  ? If you have a face mask, wear it whenever you're around other people. It can help stop the spread of the virus when you cough or sneeze. ? Clean and disinfect your home every day. Use household  and disinfectant wipes or sprays. Take special care to clean things that you grab with your hands. These include doorknobs, remote controls, phones, and handles on your refrigerator and microwave. And don't forget countertops, tabletops, bathrooms, and computer keyboards. When to call for help  Call 911 anytime you think you may need emergency care. For example, call if:  · You have severe trouble breathing. (You can't talk at all.)  · You have constant chest pain or pressure. · You are severely dizzy or lightheaded. · You are confused or can't think clearly. · Your face and lips have a blue color. · You pass out (lose consciousness) or are very hard to wake up. Call your doctor now if you develop symptoms such as:  · Shortness of breath. · Fever. · Cough. If you need to get care, call ahead to the doctor's office for instructions before you go. Make sure you wear a face mask, if you have one, to prevent exposing other people to the virus. Where can you get the latest information? The following health organizations are tracking and studying this virus. Their websites contain the most up-to-date information. Monica Chiang also learn what to do if you think you may have been exposed to the virus. · U.S. Centers for Disease Control and Prevention (CDC): The CDC provides updated news about the disease and travel advice. The website also tells you how to prevent the spread of infection. www.cdc.gov  · World Health Organization [facility]): WHO offers information about the virus outbreaks.  WHO also has travel advice. www.who.int  Current as of: April 1, 2020               Content Version: 12.4  © 5591-4487 [employer]orporated. Care instructions adapted under license by your healthcare professional. If you have questions about a medical condition or this instruction, always ask your healthcare professional. [address] any warranty or liability for your use of this information.

## 2022-06-01 NOTE — ANESTHESIA PREPROCEDURE EVALUATION
Relevant Problems   No relevant active problems       Anesthetic History   No history of anesthetic complications            Review of Systems / Medical History  Patient summary reviewed, nursing notes reviewed and pertinent labs reviewed    Pulmonary  Within defined limits          Asthma        Neuro/Psych   Within defined limits           Cardiovascular  Within defined limits  Hypertension                   GI/Hepatic/Renal  Within defined limits              Endo/Other  Within defined limits  Diabetes    Morbid obesity and arthritis     Other Findings              Physical Exam    Airway  Mallampati: II  TM Distance: > 6 cm  Neck ROM: normal range of motion   Mouth opening: Normal     Cardiovascular  Regular rate and rhythm,  S1 and S2 normal,  no murmur, click, rub, or gallop             Dental  No notable dental hx       Pulmonary  Breath sounds clear to auscultation               Abdominal  GI exam deferred       Other Findings            Anesthetic Plan    ASA: 3  Anesthesia type: MAC            Anesthetic plan and risks discussed with: Patient

## 2022-06-01 NOTE — H&P
[address]      History and Physical       NAME:  Zoila Landin   :   1950   MRN:   [id number]             History of Present Illness:  Patient is a 66 y.o. who is seen for screening colonoscopy . PMH:  Past Medical History:   Diagnosis Date    Asthma     Diabetes ([address]     Glaucoma     Hypercholesterolemia     Hypertension        PSH:  Past Surgical History:   Procedure Laterality Date    HX BREAST BIOPSY Right     benign    HX COLONOSCOPY      HX GYN      status post hysterectomy,BSO,and C-sections x3    HX ORTHOPAEDIC      Arthroscopic surgery for both knees       Allergies: Allergies   Allergen Reactions    Percocet [Oxycodone-Acetaminophen] Rash and Itching       Home Medications:  Prior to Admission Medications   Prescriptions Last Dose Informant Patient Reported? Taking? Blood-Glucose Meter monitoring kit   No No   Sig: Blood sugar checks daily   NEXIUM 40 mg capsule Not Taking at Unknown time  Yes No   Patient not taking: Reported on 2022   TRAVATAN Z 0.004 % ophthalmic solution 2022 at Unknown time  Yes Yes   acetaminophen (TYLENOL EXTRA STRENGTH) 500 mg tablet Not Taking at Unknown time  Yes No   Sig: Take 500 mg by mouth every six (6) hours as needed for Pain. Patient not taking: Reported on 2022   albuterol (PROVENTIL HFA, VENTOLIN HFA, PROAIR HFA) 90 mcg/actuation inhaler 2022 at Unknown time  No Yes   Sig: Take 2 Puffs by inhalation every four (4) hours as needed for Wheezing. Dx.j45.909   albuterol (PROVENTIL VENTOLIN) 2.5 mg /3 mL (0.083 %) nebulizer solution 2022 at Unknown time  No Yes   Sig: 3 mL by Nebulization route every four (4) hours as needed for Wheezing. amLODIPine (NORVASC) 10 mg tablet 2022 at Unknown time  No Yes   Sig: Take 1 Tab by mouth daily.    dorzolamide (TRUSOPT) 2 % ophthalmic solution 2022 at Unknown time  Yes Yes   gabapentin (NEURONTIN) 100 mg capsule 5/31/2022 at Unknown time  No Yes   Sig: TAKE 1 CAPSULE BY MOUTH THREE TIMES DAILY   glucose blood VI test strips (True Metrix Glucose Test Strip) strip   No No   Sig: Use to test blood sugar once daily. Dx.e11.9   lancets misc   No No   Sig: Use to test blood sugar once daily. Dx.e11.9   lisinopril-hydroCHLOROthiazide (PRINZIDE, ZESTORETIC) 20-12.5 mg per tablet 6/1/2022 at Unknown time  No Yes   Sig: TAKE 1 TABLET BY MOUTH DAILY   metFORMIN ER (GLUCOPHAGE XR) 500 mg tablet 5/31/2022 at Unknown time  No Yes   Sig: TAKE 2 TABLETS BY MOUTH TWICE DAILY   potassium chloride (K-DUR, KLOR-CON) 20 mEq tablet 5/31/2022 at Unknown time  No Yes   Sig: TAKE 1 TABLET BY MOUTH DAILY   pravastatin (PRAVACHOL) 80 mg tablet 5/31/2022 at Unknown time  No Yes   Sig: TAKE 1 TABLET BY MOUTH EVERY NIGHT AT BEDTIME   sitagliptin (JANUVIA) 100 mg tablet 5/31/2022 at Unknown time  Yes Yes   Sig: Take 100 mg by mouth daily.       Facility-Administered Medications: None       Hospital Medications:  Current Facility-Administered Medications   Medication Dose Route Frequency    0.9% sodium chloride infusion  50 mL/hr IntraVENous CONTINUOUS    sodium chloride (NS) flush 5-40 mL  5-40 mL IntraVENous Q8H    sodium chloride (NS) flush 5-40 mL  5-40 mL IntraVENous PRN    midazolam (VERSED) injection 0.25-5 mg  0.25-5 mg IntraVENous Multiple    fentaNYL citrate (PF) injection  mcg   mcg IntraVENous Multiple    naloxone (NARCAN) injection 0.4 mg  0.4 mg IntraVENous Multiple    flumazeniL (ROMAZICON) 0.1 mg/mL injection 0.2 mg  0.2 mg IntraVENous Multiple    simethicone (MYLICON) 23WR/6.0VS oral drops 80 mg  1.2 mL Oral Multiple    atropine injection 0.5 mg  0.5 mg IntraVENous ONCE PRN    EPINEPHrine (ADRENALIN) 0.1 mg/mL syringe 1 mg  1 mg Endoscopically ONCE PRN     Facility-Administered Medications Ordered in Other Encounters   Medication Dose Route Frequency    0.9% sodium chloride infusion   IntraVENous CONTINUOUS Social History:  Social History     Tobacco Use    Smoking status: Never Smoker    Smokeless tobacco: Never Used   Substance Use Topics    Alcohol use: Yes     Alcohol/week: 0.8 standard drinks     Types: 1 Cans of beer per week       Family History:  Family History   Problem Relation Age of Onset    Cancer Mother     Lung Disease Father     Breast Cancer Sister 46    Cancer Brother     Anesth Problems Neg Hx          The patient was counseled at length about the risks of santosh Covid-19 in the lindsey-operative and post-operative states including the recovery window of their procedure. The patient was made aware that santosh Covid-19 after a surgical procedure may worsen their prognosis for recovering from the virus and lend to a higher morbidity and or mortality risk. The patient was given the options of postponing their procedure. All of the risks, benefits, and alternatives were discussed. The patient does  wish to proceed with the procedure. Review of Systems:      Constitutional: negative fever, negative chills, negative weight loss  Eyes:   negative visual changes  ENT:   negative sore throat, tongue or lip swelling  Respiratory:  negative cough, negative dyspnea  Cards:  negative for chest pain, palpitations, lower extremity edema  GI:   See HPI  :  negative for frequency, dysuria  Integument:  negative for rash and pruritus  Heme:  negative for easy bruising and gum/nose bleeding  Musculoskel: negative for myalgias,  back pain and muscle weakness  Neuro: negative for headaches, dizziness, vertigo  Psych:  negative for feelings of anxiety, depression       Objective:     Patient Vitals for the past 8 hrs:   BP Temp Pulse Resp SpO2 Height Weight   06/01/22 1542 115/74 98 °F (36.7 °C) 70 22 98 % 5' 2\" (1.575 m) 72.1 kg (159 lb)     No intake/output data recorded. No intake/output data recorded.     EXAM:     NEURO-a&o   HEENT-wnl   LUNGS-clear    COR-regular rate and rhythym ABD-soft , no tenderness, no rebound, good bs     EXT-no edema     Data Review     No results for input(s): WBC, HGB, HCT, PLT, HGBEXT, HCTEXT, PLTEXT in the last 72 hours. No results for input(s): NA, K, CL, CO2, BUN, CREA, GLU, PHOS, CA in the last 72 hours. No results for input(s): AP, TBIL, TP, ALB, GLOB, GGT, AML, LPSE in the last 72 hours. No lab exists for component: SGOT, GPT, AMYP, HLPSE  No results for input(s): INR, PTP, APTT, INREXT in the last 72 hours. Assessment:     · Screening  Colonoscopy      Patient Active Problem List   Diagnosis Code    Hypertension I10    Diabetes mellitus ([facility] [address] E11.9    Dyslipidemia E78.5    Osteoarthritis M19.90    Reactive airway disease J45.909    Rhinitis J31.0    Acute midline low back pain without sciatica M54.50    Right bundle branch block I45.10    Overweight (BMI 25.0-29. 9) E66.3       Plan:   ·   · Endoscopic procedure with sedation     Signed By: Cristopher Adams MD     6/1/2022  4:02 PM

## 2022-06-01 NOTE — PROCEDURES
[address], [address] [place] [address]      Colonoscopy Operative Report    Ortega Hernandez  [id number]  1950      Procedure Type:   Colonoscopy --diagnostic     Indications:    Screening colonoscopy       Pre-operative Diagnosis: see indication above    Post-operative Diagnosis:  See findings below    :  Leo Duffy MD    Surgical Assistant: Endoscopy Technician-1: Roland Coello  Endoscopy RN-1: Lea Montenegro RN    Implants:  None    Referring Provider: Conor Dill MD      Sedation:  MAC anesthesia Propofol      Procedure Details:  After informed consent was obtained with all risks and benefits of procedure explained and preoperative exam completed, the patient was taken to the endoscopy suite and placed in the left lateral decubitus position. Upon sequential sedation as per above, a digital rectal exam was performed demonstrating internal hemorrhoids. The Olympus videocolonoscope  was inserted in the rectum and carefully advanced to the cecum, which was identified by the ileocecal valve and appendiceal orifice. The cecum was identified by the ileocecal valve and appendiceal orifice. The quality of preparation was good. The colonoscope was slowly withdrawn with careful evaluation between folds. Retroflexion in the rectum was completed . Findings:   Rectum: normal  Sigmoid: mild diverticulosis    Descending Colon: normal  Transverse Colon: normal  Ascending Colon: normal  Cecum: normal    Specimen Removed:  none    Complications: None. EBL:  None.     Impression:    see findings    Recommendations: --For colon cancer screening in this average-risk patient, colonoscopy may be repeated in 10 years as long as she is in good medical condition      Signed By: Leo Duffy MD     6/1/2022  4:35 PM

## 2022-06-01 NOTE — PROGRESS NOTES
Initial RN admission and assessment performed and documented in Endoscopy navigator. Patient evaluated by anesthesia in pre-procedure holding. All procedural vital signs, airway assessment, and level of consciousness information monitored and recorded by anesthesia staff on the anesthesia record. Report received from CRNA post procedure. Patient transported to recovery area by RN. Endoscope was pre-cleaned at bedside immediately following procedure by Amado Urrutia. I have reviewed discharge instructions with the patient. The patient verbalized understanding.

## 2024-08-30 ENCOUNTER — HOSPITAL ENCOUNTER (EMERGENCY)
Facility: HOSPITAL | Age: 74
Discharge: HOME | End: 2024-08-30
Attending: STUDENT IN AN ORGANIZED HEALTH CARE EDUCATION/TRAINING PROGRAM
Payer: COMMERCIAL

## 2024-08-30 VITALS
HEART RATE: 69 BPM | RESPIRATION RATE: 18 BRPM | DIASTOLIC BLOOD PRESSURE: 79 MMHG | SYSTOLIC BLOOD PRESSURE: 174 MMHG | TEMPERATURE: 98 F | OXYGEN SATURATION: 98 %

## 2024-08-30 DIAGNOSIS — M26.609 TMJ DYSFUNCTION: Primary | ICD-10-CM

## 2024-08-30 PROCEDURE — 99284 EMERGENCY DEPT VISIT MOD MDM: CPT

## 2024-08-30 PROCEDURE — 6370000000 HC RX 637 (ALT 250 FOR IP): Performed by: STUDENT IN AN ORGANIZED HEALTH CARE EDUCATION/TRAINING PROGRAM

## 2024-08-30 PROCEDURE — 6360000002 HC RX W HCPCS: Performed by: STUDENT IN AN ORGANIZED HEALTH CARE EDUCATION/TRAINING PROGRAM

## 2024-08-30 PROCEDURE — 96372 THER/PROPH/DIAG INJ SC/IM: CPT

## 2024-08-30 RX ORDER — DIAZEPAM 5 MG
2.5 TABLET ORAL
Status: COMPLETED | OUTPATIENT
Start: 2024-08-30 | End: 2024-08-30

## 2024-08-30 RX ORDER — KETOROLAC TROMETHAMINE 30 MG/ML
30 INJECTION, SOLUTION INTRAMUSCULAR; INTRAVENOUS ONCE
Status: COMPLETED | OUTPATIENT
Start: 2024-08-30 | End: 2024-08-30

## 2024-08-30 RX ORDER — NAPROXEN 500 MG/1
500 TABLET ORAL 2 TIMES DAILY WITH MEALS
Qty: 14 TABLET | Refills: 0 | Status: SHIPPED | OUTPATIENT
Start: 2024-08-30

## 2024-08-30 RX ADMIN — DIAZEPAM 2.5 MG: 5 TABLET ORAL at 18:45

## 2024-08-30 RX ADMIN — KETOROLAC TROMETHAMINE 30 MG: 30 INJECTION, SOLUTION INTRAMUSCULAR at 18:44

## 2024-08-30 ASSESSMENT — PAIN DESCRIPTION - DESCRIPTORS
DESCRIPTORS: ACHING
DESCRIPTORS: ACHING

## 2024-08-30 ASSESSMENT — ENCOUNTER SYMPTOMS
COUGH: 0
SHORTNESS OF BREATH: 0
FACIAL SWELLING: 0
EYE REDNESS: 0
EYE DISCHARGE: 0

## 2024-08-30 ASSESSMENT — PAIN DESCRIPTION - ONSET: ONSET: ON-GOING

## 2024-08-30 ASSESSMENT — PAIN SCALES - GENERAL
PAINLEVEL_OUTOF10: 10
PAINLEVEL_OUTOF10: 10

## 2024-08-30 ASSESSMENT — PAIN - FUNCTIONAL ASSESSMENT
PAIN_FUNCTIONAL_ASSESSMENT: PREVENTS OR INTERFERES SOME ACTIVE ACTIVITIES AND ADLS
PAIN_FUNCTIONAL_ASSESSMENT: 0-10
PAIN_FUNCTIONAL_ASSESSMENT: ACTIVITIES ARE NOT PREVENTED

## 2024-08-30 ASSESSMENT — PAIN DESCRIPTION - FREQUENCY: FREQUENCY: CONTINUOUS

## 2024-08-30 ASSESSMENT — PAIN DESCRIPTION - ORIENTATION: ORIENTATION: LEFT

## 2024-08-30 ASSESSMENT — PAIN DESCRIPTION - LOCATION
LOCATION: JAW
LOCATION: JAW

## 2024-08-30 ASSESSMENT — PAIN DESCRIPTION - PAIN TYPE: TYPE: ACUTE PAIN

## 2024-08-30 NOTE — ED NOTES
Discharge paperwork reviewed with pt & questions answered. Pt walked off unit in no apparent acute distress.

## 2024-08-30 NOTE — ED PROVIDER NOTES
[facility] EMERGENCY DEP  EMERGENCY DEPARTMENT ENCOUNTER      Pt Name: Mirella Hobson  MRN: [id number]  Birthdate 1950  Date of evaluation: 8/30/2024  Provider: Sue Rodgers DO    CHIEF COMPLAINT       Chief Complaint   Patient presents with    Jaw Pain         HISTORY OF PRESENT ILLNESS    HPI    Mirella Hobson is a 73 y.o. female with a history of HTN, HLD, DM who presents to the emergency department for jaw pain. States she had an episode of left sided jaw pain while eating several days ago subsequently went away. Notes pain returned last night and has been worsening since onset. States today while eating she had a pop in the left jaw and pain worsened even more, states now it hurts too much to open the mouth. No chest pain or shortness of breath. No other complaints.    Nursing Notes were reviewed.    REVIEW OF SYSTEMS       Review of Systems   Constitutional:  Negative for chills and fever.   HENT:  Negative for congestion, dental problem and facial swelling.         +jaw pain   Eyes:  Negative for discharge and redness.   Respiratory:  Negative for cough and shortness of breath.    Cardiovascular:  Negative for chest pain.   Neurological:  Negative for speech difficulty.   Psychiatric/Behavioral:  Negative for agitation.            PAST MEDICAL HISTORY     Past Medical History:   Diagnosis Date    Asthma     Diabetes (HCC)     Glaucoma     Hypercholesterolemia     Hypertension          SURGICAL HISTORY       Past Surgical History:   Procedure Laterality Date    BREAST BIOPSY Right     benign    COLONOSCOPY N/A 6/1/2022    COLONOSCOPY performed by Kapil Nova MD at [facility] ENDOSCOPY    COLONOSCOPY      GYN      status post hysterectomy,BSO,and C-sections x3    ORTHOPEDIC SURGERY      Arthroscopic surgery for both knees         CURRENT MEDICATIONS       Previous Medications    ACETAMINOPHEN (TYLENOL) 500 MG TABLET    Take by mouth every 6 hours as needed    ALBUTEROL (PROVENTIL) (2.5  at the time of this note:    No orders to display        LABS:  Labs Reviewed - No data to display    All other labs were within normal range or not returned as of this dictation.    EMERGENCY DEPARTMENT COURSE and DIFFERENTIAL DIAGNOSIS/MDM:   Vitals:    Vitals:    08/30/24 1746   BP: (!) 174/79   Pulse: 69   Resp: 18   Temp: 98 °F (36.7 °C)   TempSrc: Tympanic   SpO2: 98%           Medical Decision Making      DECISION MAKING:  Mirella Hobson is a 73 y.o. female who comes in as above.  Vital signs reviewed, patient is afebrile.  Blood pressure 174/79, vital signs otherwise stable.  On my examination she is uncomfortable, but nontoxic.  There is tenderness over the left TMJ joint and clicking with attempted opening of mouth.  No preceding trauma or injury therefore I doubt dislocation.  She has no other symptoms including chest pain or shortness of breath, therefore doubt ACS especially given the associated tenderness and pain with mouth opening.  Will treat symptoms and reassess.      Risk  Prescription drug management.        CONSULTS:  None    REASSESSMENT     On reassessment, patient more comfortable appearing.  Discussed with patient regarding pain control at home with naproxen, she was also encouraged to follow-up with OMFS and soft diet to help prevent exacerbation of symptoms.  ER return precautions given.  Patient and/or family verbally conveyed their understanding and agreement of the patient's signs, symptoms, diagnosis, treatment and prognosis and additionally agree to follow-up as recommended in the discharge instructions or to return to the Emergency Department should their condition change or worsen prior to their follow-up appointment.  All questions have been answered and patient and/or available family express understanding.        PROCEDURES:  Unless otherwise noted below, none     Procedures      FINAL IMPRESSION      1. TMJ dysfunction          DISPOSITION/PLAN   DISPOSITION Decision To

## 2024-08-30 NOTE — ED TRIAGE NOTES
Pt reports L sided jaw pain x3 days. Pt is not able to open her mouth fully. Pt has been taking OTC meds with no relief. Pt denies injury.

## (undated) DEVICE — DRAPE,U/ SHT,SPLIT,PLAS,STERIL: Brand: MEDLINE

## (undated) DEVICE — INTENDED FOR TISSUE SEPARATION, AND OTHER PROCEDURES THAT REQUIRE A SHARP SURGICAL BLADE TO PUNCTURE OR CUT.: Brand: BARD-PARKER ® CARBON RIB-BACK BLADES

## (undated) DEVICE — CURITY NON-ADHERENT STRIPS: Brand: CURITY

## (undated) DEVICE — KERLIX BANDAGE ROLL: Brand: KERLIX

## (undated) DEVICE — SUTURE VCRL SZ 2-0 L27IN ABSRB UD L26MM SH 1/2 CIR J417H

## (undated) DEVICE — SOLUTION IV 1000ML 0.9% SOD CHL

## (undated) DEVICE — ARGYLE FRAZIER SURGICAL SUCTION INSTRUMENT 10 FR/CH (3.3 MM): Brand: ARGYLE

## (undated) DEVICE — ZIMMER® STERILE DISPOSABLE TOURNIQUET CUFF WITH PROTECTIVE SLEEVE AND PLC, DUAL PORT, SINGLE BLADDER, 18 IN. (46 CM)

## (undated) DEVICE — SUTURE MCRYL SZ 4-0 L27IN ABSRB UD L19MM PS-2 1/2 CIR PRIM Y426H

## (undated) DEVICE — BANDAGE COMPR SELF ADH 5 YDX4 IN TAN STRL PREMIERPRO LF

## (undated) DEVICE — MASTISOL ADHESIVE LIQ 2/3ML

## (undated) DEVICE — REM POLYHESIVE ADULT PATIENT RETURN ELECTRODE: Brand: VALLEYLAB

## (undated) DEVICE — 3M™ STERI-STRIP™ REINFORCED ADHESIVE SKIN CLOSURES, R1546, 1/4 IN X 4 IN (6 MM X 100 MM), 10 STRIPS/ENVELOPE: Brand: 3M™ STERI-STRIP™

## (undated) DEVICE — STERILE POLYISOPRENE POWDER-FREE SURGICAL GLOVES: Brand: PROTEXIS

## (undated) DEVICE — SUTURE VCRL SZ 3-0 L27IN ABSRB UD L24MM PS-1 3/8 CIR PRIM J936H

## (undated) DEVICE — STANDARD SURGICAL GOWN, L: Brand: CONVERTORS

## (undated) DEVICE — GAUZE SPONGES,12 PLY: Brand: CURITY

## (undated) DEVICE — (D)PREP SKN CHLRAPRP APPL 26ML -- CONVERT TO ITEM 371833

## (undated) DEVICE — SUTURE PROL SZ 4-0 L18IN NONABSORBABLE BLU L16MM PC-3 3/8 8634G

## (undated) DEVICE — CONVERTORS STOCKINETTE: Brand: CONVERTORS

## (undated) DEVICE — BLADE SAW OSC COARSE 25X9MM --

## (undated) DEVICE — DRAPE,EXTREMITY,89X128,STERILE: Brand: MEDLINE

## (undated) DEVICE — BASIC PACK: Brand: CONVERTORS

## (undated) DEVICE — SYR 10ML LUER LOK 1/5ML GRAD --

## (undated) DEVICE — 1010 S-DRAPE TOWEL DRAPE 10/BX: Brand: STERI-DRAPE™

## (undated) DEVICE — SURGICAL PROCEDURE PACK BASIN MAJ SET CUST NO CAUT

## (undated) DEVICE — ROCKER SWITCH PENCIL BLADE ELECTRODE, HOLSTER: Brand: EDGE

## (undated) DEVICE — DRAPE C ARM W54XL84IN MINI FOR OEC 6800

## (undated) DEVICE — NEEDLE HYPO 25GA L1.5IN BVL ORIENTED ECLIPSE

## (undated) DEVICE — DRAPE SHT 3 QTR PROXIMA 53X77 --

## (undated) DEVICE — Z DISCONTINUEDSOLUTION PREP 2OZ 10% POVIDONE IOD SCR CAP BTL

## (undated) DEVICE — BANDAGE COMPR 9 FTX4 IN SMOOTH COMFORTABLE SYNTH ESMRK LF